# Patient Record
(demographics unavailable — no encounter records)

---

## 2018-01-01 NOTE — RADIOLOGY REPORT (SQ)
EXAM DESCRIPTION:  CT HEAD WITHOUT



COMPLETED DATE/TIME:  1/1/2018 8:37 am



REASON FOR STUDY:  confusion, altered



COMPARISON:  None.



TECHNIQUE:  Axial images acquired through the brain without intravenous contrast.  Images reviewed wi
th bone, brain and subdural windows.  Images stored on PACS.

All CT scanners at this facility use dose modulation, iterative reconstruction, and/or weight based d
osing when appropriate to reduce radiation dose to as low as reasonably achievable (ALARA).

[facility]: Dose Right  [facility]: CareDose    [facility]: Dose Right    [facility]: Teradose 4D    [facility]: Smart Technologies



RADIATION DOSE:  CT Rad equipment meets quality standard of care and radiation dose reduction techniq
ues were employed. CTDIvol: 64.6 mGy. DLP: 1163 mGy-cm. mGy.



LIMITATIONS:  None.



FINDINGS:  VENTRICLES: Normal size and contour.

CEREBRUM: No masses.  No hemorrhage.  No midline shift.  No evidence for acute infarction. Normal gra
y/white matter differentiation. No areas of low density in the white matter.

CEREBELLUM: No masses.  No hemorrhage.  No alteration of density.  No evidence for acute infarction.

EXTRAAXIAL SPACES: No fluid collections.  No masses.

ORBITS AND GLOBE: No intra- or extraconal masses.  Normal contour of globe without masses.

CALVARIUM: No fracture.

PARANASAL SINUSES: No fluid or mucosal thickening.

SOFT TISSUES: No mass or hematoma.

OTHER: No other significant finding.



IMPRESSION:  NO ACUTE INTRACRANIAL IMAGING FINDINGS.

EVIDENCE OF ACUTE STROKE: NO.



COMMENT:  Quality ID # [id number]: Final reports with documentation of one or more dose reduction techniques
 (e.g., Automated exposure control, adjustment of the mA and/or kV according to patient size, use of 
iterative reconstruction technique)



TECHNICAL DOCUMENTATION:  JOB ID:  [id number]

 2011 [employer]- All Rights Reserved

## 2018-01-01 NOTE — RADIOLOGY REPORT (SQ)
EXAM DESCRIPTION:  CHEST SINGLE VIEW



COMPLETED DATE/TIME:  1/1/2018 12:17 pm



REASON FOR STUDY:  confused, fever



COMPARISON:  11/10/2016



EXAM PARAMETERS:  NUMBER OF VIEWS: One view.

TECHNIQUE: Single frontal radiographic view of the chest acquired.

RADIATION DOSE: NA

LIMITATIONS: None.



FINDINGS:  LUNGS AND PLEURA: No opacities, masses or pneumothorax. No pleural effusion.

MEDIASTINUM AND HILAR STRUCTURES: No masses.  Contour normal.

HEART AND VASCULAR STRUCTURES: Heart stable in size.  Normal vasculature.

BONES: No acute findings.

HARDWARE: None in the chest.

OTHER: No other significant finding.



IMPRESSION:  NO ACUTE RADIOGRAPHIC FINDING IN THE CHEST.  NO SIGNIFICANT CHANGE FROM PRIOR STUDY.



TECHNICAL DOCUMENTATION:  JOB ID:  [id number]

 2011 [employer]- All Rights Reserved

## 2018-01-01 NOTE — RADIOLOGY REPORT (SQ)
EXAM DESCRIPTION:  FOOT LEFT COMPLETE



COMPLETED DATE/TIME:  1/1/2018 1:56 pm



REASON FOR STUDY:  cellulitis, left ankle tenderness



COMPARISON:  None.



NUMBER OF VIEWS:  Three views.



TECHNIQUE:  AP, lateral and oblique  radiographic images acquired of the left foot.



LIMITATIONS:  None.



FINDINGS:  MINERALIZATION: Normal.

BONES: No acute fracture or dislocation.  No worrisome bone lesions.

JOINTS: No effusions.

SOFT TISSUES: No soft tissue swelling.  No foreign body.

OTHER: No other significant finding.



IMPRESSION:  NEGATIVE STUDY OF THE LEFT FOOT. NO RADIOGRAPHIC EVIDENCE OF ACUTE INJURY.



TECHNICAL DOCUMENTATION:  JOB ID:  [id number]

 2011 [employer]- All Rights Reserved

## 2018-01-01 NOTE — PDOC H&P
History of Present Illness


Admission Date/PCP: 


  01/01/18 14:28





  NATASHA TAVERAS MD





Patient complains of: Fever


History of Present Illness: 


JOEL RUSSELL is a 59 year old male of Dr Taveras and resident at [facility] who was brought to the ED by EMS with 3-4 days of fever. Facility staff 

reported onset of associated confusion this morning and reported oral 

temperature of 101F. He denied any coughing, nasal or sinus congestion, chest 

pain, dysuria, flank pain, hematuria, nausea, vomiting, or abdominal pain. His 

initial evaluation in the ED was remarkable for left leg cellulitis and 

associated leukocytosis with left shift. he was subsequently advised 

hospitalization for further evaluation and management. His morbidities include 

Hypertesion, Hyperlipidemia, Diabetes Mellitus Type 2, and Depression.





Past Medical History


Cardiac Medical History: Reports: Hyperlipidema, Hypertension


Pulmonary Medical History: Reports: Pneumonia - "Neurocognitive Disorder"


Endocrine Medical History: Reports: Diabetes Mellitus Type 2


Psychiatric Medical History: Reports: Depression





Past Surgical History


Past Surgical History: Reports: Orthopedic Surgery - Left Knee





Social History


Smoking Status: Current Every Day Smoker


Cigarettes Packs Per Day: 1


Frequency of Alcohol Use: None


Hx Recreational Drug Use: No


Drugs: None


Hx Prescription Drug Abuse: No





Family History


Family History: Reviewed & Not Pertinent


Parental Family History Reviewed: Yes


Children Family History Reviewed: Yes


Sibling(s) Family History Reviewed.: Yes





Medication/Allergy


Home Medications: 








Acetaminophen [Tylenol 325 mg Tablet] 650 mg PO Q4HP PRN 01/01/18 


Acetaminophen [Tylenol 325 mg Tablet] 650 mg PO Q8 01/01/18 


Atenolol [Tenormin] 50 mg PO DAILY 01/01/18 


Benztropine Mesylate 1 mg PO BID 01/01/18 


Bismuth Subsalicylate [Bismuth] 15 ml PO Q4HP PRN 01/01/18 


Bupropion HCl [Wellbutrin 75 mg Tablet] 75 mg PO DAILY 01/01/18 


Calcium Carbonate [Tums Chewable 500 mg Tab.chew] 1,000 mg PO Q1HP PRN 01/01/18 


Diazepam [Valium 5 mg Tablet] 5 mg PO Q8HP PRN 01/01/18 


Divalproex Sodium [Depakote] 1,000 mg PO QHS 01/01/18 


Divalproex Sodium [Depakote] 500 mg PO DAILY 01/01/18 


Doxepin HCl [Silenor] 3 mg PO QHS 01/01/18 


Ergocalciferol (Vitamin D2) [Drisdol 50,000 unit (1.25MG) Capsule] 50,000 unit 

PO MO@1000 01/01/18 


Lubiprostone [Amitiza] 24 mcg PO BID 01/01/18 


Mag Hydrox/Al Hydrox/Simeth [Maalox Plus Susp 30 Udcup] 30 ml PO Q4HP PRN 01/01/ 18 


Magnesium Hydroxide [Milk of Magnesia 30 ml Udcup] 30 ml PO BIDP PRN 01/01/18 


Melatonin [Melatin] 3 mg PO QHS 01/01/18 


Metformin HCl [Glucophage] 1,000 mg PO BIDBS 01/01/18 


Methylcellulose [Fiber] 1,000 mg PO BID 01/01/18 


Olanzapine [Olanzapine Odt] 15 mg SL BID 01/01/18 


Propylene Glycol/Peg 400/Pf [Systane 0.3-0.4% Eye Drops] 1 drop OU QID 01/01/18 


Rosuvastatin Calcium [Crestor 20 mg Tablet] 20 mg PO DAILY 01/01/18 


Sennosides [Senna] 8.6 mg PO DAILYP PRN 01/01/18 


Tamsulosin HCl [Flomax 0.4 mg Cap.sr] 0.4 mg PO DAILY 01/01/18 


Zolpidem Tartrate [Ambien] 10 mg PO QHS 01/01/18 








Allergies/Adverse Reactions: 


 





Penicillins Allergy (Verified 08/11/16 11:27)


 











Review of Systems


All systems: reviewed and no additional remarkable complaints except as stated





Physical Exam


Vital Signs: 


 











Temp Pulse Resp BP Pulse Ox


 


 100.4 F   92   24 H  114/48 L  97 


 


 01/01/18 15:01  01/01/18 16:46  01/01/18 16:22  01/01/18 16:22  01/01/18 16:22








 Intake & Output











 12/31/17 01/01/18 01/02/18





 06:59 06:59 06:59


 


Intake Total   400


 


Balance   400











General appearance: PRESENT: no acute distress, well-developed, well-nourished


Head exam: PRESENT: atraumatic, normocephalic


Eye exam: PRESENT: conjunctiva pink, EOMI, PERRLA.  ABSENT: scleral icterus


Mouth exam: PRESENT: moist


Teeth exam: PRESENT: dental caries


Throat exam: ABSENT: post pharyngeal erythema, tonsillar erythema, tonsillar 

exudate, tonsillogmegaly, other


Neck exam: PRESENT: full ROM.  ABSENT: carotid bruit, JVD, lymphadenopathy, 

thyromegaly


Respiratory exam: PRESENT: clear to auscultation lilli.  ABSENT: crackles, 

prolonged expiratory phas, rhonchi, wheezes


Cardiovascular exam: PRESENT: RRR.  ABSENT: diastolic murmur, rubs, systolic 

murmur


Vascular exam: PRESENT: normal capillary refill.  ABSENT: pallor


GI/Abdominal exam: PRESENT: normal bowel sounds, soft.  ABSENT: distended, 

guarding, mass, organolmegaly, rebound, tenderness


Rectal exam: PRESENT: deferred


Extremities exam: PRESENT: pedal edema - mostly involving the left leg


Musculoskeletal exam: PRESENT: tenderness - expressed tenderness to palpation 

of the left leg


Neurological exam: PRESENT: alert, awake, oriented to person, oriented to place

, oriented to time, oriented to situation, CN II-XII grossly intact.  ABSENT: 

motor sensory deficit


Psychiatric exam: PRESENT: appropriate affect, normal mood.  ABSENT: homicidal 

ideation, suicidal ideation


Skin exam: PRESENT: dry, erythema - involving left leg, rash, warm





Results


Laboratory Results: 


I reviewed his lab results on [facility] and form significant portion of my 

medical decision making in this case.


Impressions: 


 





Head CT  01/01/18 07:24


IMPRESSION:  NO ACUTE INTRACRANIAL IMAGING FINDINGS.


EVIDENCE OF ACUTE STROKE: NO.


 








Chest X-Ray  01/01/18 11:11


IMPRESSION:  NO ACUTE RADIOGRAPHIC FINDING IN THE CHEST.  NO SIGNIFICANT CHANGE 

FROM PRIOR STUDY.


 








Tibia/Fibula X-Ray  01/01/18 13:12


IMPRESSION:  NO RADIOGRAPHIC EVIDENCE OF ACUTE INJURY.


 








Foot X-Ray  01/01/18 13:17


IMPRESSION:  NEGATIVE STUDY OF THE LEFT FOOT. NO RADIOGRAPHIC EVIDENCE OF ACUTE 

INJURY.


 














Assessment & Plan





- Diagnosis


(1) Cellulitis of left leg without foot


Is this a current diagnosis for this admission?: Yes   


Plan: 


See covering attending physician orders.








(2) Type 2 diabetes mellitus


Qualifiers: 


   Diabetes mellitus complication status: without complication   Diabetes 

mellitus long term insulin use: without long term use   Qualified Code(s): 

E11.9 - Type 2 diabetes mellitus without complications   


Is this a current diagnosis for this admission?: Yes   


Plan: 


See covering attending physician orders.








(3) HTN (hypertension)


Qualifiers: 


   Hypertension type: essential hypertension   Qualified Code(s): I10 - 

Essential (primary) hypertension   


Is this a current diagnosis for this admission?: Yes   


Plan: 


See covering attending physician orders.








(4) HLD (hyperlipidemia)


Qualifiers: 


   Hyperlipidemia type: pure hypercholesterolemia   Qualified Code(s): E78.00 - 

Pure hypercholesterolemia, unspecified; E78.0 - Pure hypercholesterolemia   


Is this a current diagnosis for this admission?: Yes   


Plan: 


See covering attending physician orders.








(5) Bipolar 1 disorder


Is this a current diagnosis for this admission?: Yes   


Plan: 


See covering attending physician orders.








- Time


Time Spent: 50 to 70 Minutes


Medications reviewed and adjusted accordingly: Yes


Anticipated discharge: Other - [facility]


Within: Other





- Inpatient Certification


Based on my medical assessment, after consideration of the patient's 

comorbidities, presenting symptoms, or acuity I expect that the services needed 

warrant INPATIENT care.: Yes


I certify that my determination is in accordance with my understanding of 

Medicare's requirements for reasonable and necessary INPATIENT services [42 CFR 

412.3e].: Yes


Medical Necessity: Need Close Monitoring Due to Risk of Patient Decompensation, 

Need For IV Fluids, Need For Continuous Telemetry Monitoring, Need for IV 

Antibiotics, Risk of Complication if Not Cared For in Hospital


Post Hospital Care: D/C or Transfer Summary





- Plan Summary


Plan Summary: 


See covering attending physician orders.

## 2018-01-01 NOTE — ER DOCUMENT REPORT
ED Fever





- General


Chief Complaint: Fever


Stated Complaint: FEVER


Time Seen by Provider: 01/01/18 07:32


Mode of Arrival: Ambulatory


Information source: Patient


Notes: 





Patient is a 59-year-old male brought in by EMS from nursing home who presents 

to the ER today for confusion this morning and fever.  Patient states that he 

feels confused.  Fever was as high as 101F at the nursing home, they did give 

him Tylenol on the ambulance.  Patient denies any pain anywhere, cough, runny 

nose, burning with urination or any other symptoms.  He complains of his 

chronic foot pain and back pain.


TRAVEL OUTSIDE OF THE [place] IN LAST 30 DAYS: No





- Related Data


Allergies/Adverse Reactions: 


 





Penicillins Allergy (Verified 08/11/16 11:27)


 








Home Medications: 


 Current Home Medications





Acetaminophen [Tylenol 325 mg Tablet] 650 mg PO Q4HP PRN 01/01/18 [History]


Acetaminophen [Tylenol 325 mg Tablet] 650 mg PO Q8 01/01/18 [History]


Atenolol [Tenormin] 50 mg PO DAILY 01/01/18 [History]


Benztropine Mesylate 1 mg PO BID 01/01/18 [History]


Bismuth Subsalicylate [Bismuth] 15 ml PO Q4HP PRN 01/01/18 [History]


Bupropion HCl [Wellbutrin 75 mg Tablet] 75 mg PO DAILY 01/01/18 [History]


Calcium Carbonate [Tums Chewable 500 mg Tab.chew] 1,000 mg PO Q1HP PRN 01/01/18 

[History]


Diazepam [Valium 5 mg Tablet] 5 mg PO Q8HP PRN 01/01/18 [History]


Divalproex Sodium [Depakote] 1,000 mg PO QHS 01/01/18 [History]


Divalproex Sodium [Depakote] 500 mg PO DAILY 01/01/18 [History]


Doxepin HCl [Silenor] 3 mg PO QHS 01/01/18 [History]


Ergocalciferol (Vitamin D2) [Drisdol 50,000 unit (1.25MG) Capsule] 50,000 unit 

PO MO@1000 01/01/18 [History]


Lubiprostone [Amitiza] 24 mcg PO BID 01/01/18 [History]


Mag Hydrox/Al Hydrox/Simeth [Maalox Plus Susp 30 Udcup] 30 ml PO Q4HP PRN 01/01/ 18 [History]


Magnesium Hydroxide [Milk of Magnesia 30 ml Udcup] 30 ml PO BIDP PRN 01/01/18 [

History]


Melatonin [Melatin] 3 mg PO QHS 01/01/18 [History]


Metformin HCl [Glucophage] 1,000 mg PO BIDBS 01/01/18 [History]


Methylcellulose [Fiber] 1,000 mg PO BID 01/01/18 [History]


Olanzapine [Olanzapine Odt] 15 mg SL BID 01/01/18 [History]


Propylene Glycol/Peg 400/Pf [Systane 0.3-0.4% Eye Drops] 1 drop OU QID 01/01/18 

[History]


Rosuvastatin Calcium [Crestor 20 mg Tablet] 20 mg PO DAILY 01/01/18 [History]


Sennosides [Senna] 8.6 mg PO DAILYP PRN 01/01/18 [History]


Tamsulosin HCl [Flomax 0.4 mg Cap.sr] 0.4 mg PO DAILY 01/01/18 [History]


Zolpidem Tartrate [Ambien] 10 mg PO QHS 01/01/18 [History]











Past Medical History





- General


Information source: Patient, Outside Facility Records





- Social History


Smoking Status: Current Every Day Smoker


Chew tobacco use (# tins/day): No


Frequency of alcohol use: None


Drug Abuse: None


Family History: Reviewed & Not Pertinent


Patient has suicidal ideation: No


Patient has homicidal ideation: No





- Past Medical History


Cardiac Medical History: Reports: Hx Hypercholesterolemia, Hx Hypertension


Pulmonary Medical History: Reports: Hx Pneumonia - "Neurocognitive Disorder"


Endocrine Medical History: Reports: Hx Diabetes Mellitus Type 2


Renal/ Medical History: Denies: Hx Peritoneal Dialysis


Psychiatric Medical History: Reports: Hx Depression


Past Surgical History: Reports: Hx Orthopedic Surgery - Left Knee





- Immunizations


Immunizations up to date: Yes


Hx Diphtheria, Pertussis, Tetanus Vaccination: No





Review of Systems





- Review of Systems


Constitutional: See HPI


EENT: No symptoms reported


Cardiovascular: No symptoms reported


Respiratory: No symptoms reported


Gastrointestinal: No symptoms reported


Genitourinary: No symptoms reported


Male Genitourinary: No symptoms reported


Musculoskeletal: No symptoms reported


Skin: No symptoms reported


Hematologic/Lymphatic: No symptoms reported


Neurological/Psychological: No symptoms reported





Physical Exam





- Vital signs


Vitals: 


 











Temp Pulse Resp BP Pulse Ox


 


 100 F   98   18   118/56 L  95 


 


 01/01/18 07:23  01/01/18 07:23  01/01/18 07:23  01/01/18 07:23  01/01/18 07:23














- Notes


Notes: 





PHYSICAL EXAMINATION: 


GENERAL: Chronically ill-appearing, unkempt, smells of urine, but in no acute 

distress. 


HEAD: Atraumatic, normocephalic. 


EYES: Pupils equal round and reactive to light, extraocular movements intact, 

sclera anicteric, conjunctiva are normal. 


ENT: ear canals without erythema or foreign body, TMs pearly grey with good 

bony landmarks, nares patent, oropharynx clear without exudates. Moist mucous 

membranes. 


NECK: Normal range of motion, supple without lymphadenopathy 


LUNGS: CTAB and equal. No wheezes rales or rhonchi. 


HEART: Regular rate and rhythm without murmurs


ABDOMEN: Soft, mild suprapubic tenderness. No guarding, no rebound 


BACK: no vertebral tenderness, normal ROM


GI/: no CVA tenderness


EXTREMITIES: Normal range of motion, no pitting edema. No cyanosis. 


 NEUROLOGICAL: slow, but Follows commands appropriately, alert and oriented 3, 

cranial nerves grossly intact. Normal sensory/motor exams.  Good and equal 

strength bilaterally, Kernig and Brudzinski's signs negative, Romberg's test 

normal, normal heel to shin testing 


PSYCH: Normal mood, normal affect. 


SKIN: Warm, Dry, normal turgor, erythema noted to the entirety of the left 

lower extremity excluding the foot, tender to palpation, edematous, some 

erythema to the medial left thigh that appears to possibly be streaking from 

the lower extremity erythema, groin with fungal rash with satellite lesions

















Course





- Re-evaluation


Re-evalutation: 





01/01/18 19:03


pt has left lower extremity cellulitis.  Patient started on vancomycin and 

Rocephin, given IV fluids.  Patient has a white count of 17,000 with a left 

shift.  Patient admitted to Dr. Person at this time.  Patient did develop a 

fever while here again and was given Tylenol which resolved the fever.  

Urinalysis without signs of infection. Dr. Martines did evaluate the patient with 

myself. 


01/01/18 19:05








- Vital Signs


Vital signs: 


 











Temp Pulse Resp BP Pulse Ox


 


 100.4 F   92   24 H  114/48 L  97 


 


 01/01/18 15:01  01/01/18 16:46  01/01/18 16:22  01/01/18 16:22  01/01/18 16:22














- Laboratory


Result Diagrams: 


 01/01/18 08:10





 01/01/18 08:10


Laboratory results interpreted by me: 


 











  01/01/18 01/01/18 01/01/18





  08:10 08:10 09:24


 


WBC  17.0 H  


 


RBC  3.80 L  


 


Hgb  11.5 L  


 


Hct  34.5 L  


 


RDW  14.8 H  


 


Seg Neuts % (Manual)  93 H  


 


Lymphocytes % (Manual)  1 L  


 


Abs Neuts (Manual)  16.3 H  


 


Abs Lymphs (Manual)  0.2 L  


 


Sodium   135.6 L 


 


Potassium   3.3 L 


 


Carbon Dioxide   19 L 


 


BUN   31 H 


 


Glucose   127 H 


 


Total Protein   5.8 L 


 


Albumin   3.2 L 


 


Urine Protein    30 H


 


Urine Ketones    20 H


 


Urine Urobilinogen    4.0 H














Discharge





- Discharge


Clinical Impression: 


 Cellulitis of left leg without foot





Condition: Stable


Disposition: ADMITTED AS INPATIENT


Admitting Provider: Naya


Unit Admitted: Telemetry

## 2018-01-02 NOTE — RADIOLOGY REPORT (SQ)
EXAM DESCRIPTION: 



KUB/ABDOMEN (SINGLE VIEW)



CLINICAL HISTORY: 



59 years, Male, Vomiting



COMPARISON:

None.



LIMITATIONS:

None.



FINDINGS:



Moderate to severe nonspecific gaseous gastric distention.

Moderate colonic stool retention.



Partially imaged right femoral hardware.



IMPRESSION:



Nonspecific gastric distention.

 



 2011 [employer]- All Rights Reserved

## 2018-01-02 NOTE — PDOC PROGRESS REPORT
Subjective


Progress Note for:: 01/02/18


Subjective:: 


Patient continue to experience intermittent fever. No bowel movement since last 

evaluation. KUB suggested moderate fecal burden. No chest pain or difficulty 

with breathing. There has been nausea and vomiting but currently under control 

with IV Zofran administration.


Reason For Visit: 


CELLULITIS OF LEFT LOWER EXTREMITY








Physical Exam


Vital Signs: 


 











Temp Pulse Resp BP Pulse Ox


 


 100.3 F   91   16   125/70   95 


 


 01/02/18 04:00  01/02/18 07:00  01/02/18 04:00  01/02/18 04:00  01/02/18 04:00








 Intake & Output











 01/01/18 01/02/18 01/03/18





 06:59 06:59 06:59


 


Intake Total  2080 


 


Output Total  600 


 


Balance  1480 











General appearance: PRESENT: no acute distress, obese


Head exam: PRESENT: atraumatic, normocephalic


Eye exam: PRESENT: conjunctiva pink, EOMI, PERRLA.  ABSENT: scleral icterus


Mouth exam: PRESENT: moist


Respiratory exam: PRESENT: clear to auscultation lilli


Cardiovascular exam: PRESENT: RRR.  ABSENT: diastolic murmur, rubs, systolic 

murmur


Vascular exam: PRESENT: normal capillary refill.  ABSENT: pallor


GI/Abdominal exam: PRESENT: normal bowel sounds, soft.  ABSENT: distended, 

guarding, mass, organolmegaly, rebound, tenderness


Extremities exam: PRESENT: pedal edema - left leg region with cellulitis


Musculoskeletal exam: PRESENT: tenderness - minimally to palpation over left 

leg region with cellulitis


Neurological exam: PRESENT: alert, awake, oriented to person, oriented to place

, oriented to time, oriented to situation, CN II-XII grossly intact.  ABSENT: 

motor sensory deficit


Psychiatric exam: PRESENT: appropriate affect, normal mood.  ABSENT: homicidal 

ideation, suicidal ideation


Skin exam: PRESENT: dry, erythema - left leg region with cellulitis, warm





Results


Laboratory Results: 


 





 01/02/18 05:17 





 01/02/18 05:17 





 











  01/02/18 01/02/18





  05:17 05:17


 


WBC  18.8 H 


 


RBC  3.53 L 


 


Hgb  10.8 L 


 


Hct  31.4 L 


 


MCV  89 


 


MCH  30.5 


 


MCHC  34.2 


 


RDW  14.9 H 


 


Plt Count  239 


 


Seg Neutrophils %  Not Reportable 


 


Lymphocytes %  Not Reportable 


 


Monocytes %  Not Reportable 


 


Eosinophils %  Not Reportable 


 


Basophils %  Not Reportable 


 


Absolute Neutrophils  Not Reportable 


 


Absolute Lymphocytes  Not Reportable 


 


Absolute Monocytes  Not Reportable 


 


Absolute Eosinophils  Not Reportable 


 


Absolute Basophils  Not Reportable 


 


Sodium   138.0


 


Potassium   3.0 L*


 


Chloride   103


 


Carbon Dioxide   25


 


Anion Gap   10


 


BUN   21 H


 


Creatinine   0.62


 


Est GFR ( Amer)   > 60


 


Est GFR (Non-Af Amer)   > 60


 


Glucose   135 H


 


Calcium   8.5


 


Total Bilirubin   0.5


 


AST   43


 


ALT   31


 


Alkaline Phosphatase   57


 


Total Protein   5.2 L


 


Albumin   2.8 L











Impressions: 


 





Head CT  01/01/18 07:24


IMPRESSION:  NO ACUTE INTRACRANIAL IMAGING FINDINGS.


EVIDENCE OF ACUTE STROKE: NO.


 








Chest X-Ray  01/01/18 11:11


IMPRESSION:  NO ACUTE RADIOGRAPHIC FINDING IN THE CHEST.  NO SIGNIFICANT CHANGE 

FROM PRIOR STUDY.


 








Tibia/Fibula X-Ray  01/01/18 13:12


IMPRESSION:  NO RADIOGRAPHIC EVIDENCE OF ACUTE INJURY.


 








Foot X-Ray  01/01/18 13:17


IMPRESSION:  NEGATIVE STUDY OF THE LEFT FOOT. NO RADIOGRAPHIC EVIDENCE OF ACUTE 

INJURY.


 








KUB X-Ray  01/02/18 00:00


IMPRESSION:


 


Nonspecific gastric distention.


 


 


 2011 [employer]- All Rights Reserved


 














Assessment & Plan





- Diagnosis


(1) Cellulitis of left leg without foot


Is this a current diagnosis for this admission?: Yes   





(2) Type 2 diabetes mellitus


Qualifiers: 


   Diabetes mellitus complication status: without complication   Diabetes 

mellitus long term insulin use: without long term use   Qualified Code(s): 

E11.9 - Type 2 diabetes mellitus without complications   


Is this a current diagnosis for this admission?: Yes   





(3) HTN (hypertension)


Qualifiers: 


   Hypertension type: essential hypertension   Qualified Code(s): I10 - 

Essential (primary) hypertension   


Is this a current diagnosis for this admission?: Yes   





(4) HLD (hyperlipidemia)


Qualifiers: 


   Hyperlipidemia type: pure hypercholesterolemia   Qualified Code(s): E78.00 - 

Pure hypercholesterolemia, unspecified; E78.0 - Pure hypercholesterolemia   


Is this a current diagnosis for this admission?: Yes   





(5) Bipolar 1 disorder


Is this a current diagnosis for this admission?: Yes   





- Time


Time Spent with patient: 25-34 minutes


Medications reviewed and adjusted accordingly: Yes


Anticipated discharge: Other - retirement


Within: Other





- Inpatient Certification


Based on my medical assessment, after consideration of the patient's 

comorbidities, presenting symptoms, or acuity I expect that the services needed 

warrant INPATIENT care.: Yes


I certify that my determination is in accordance with my understanding of 

Medicare's requirements for reasonable and necessary INPATIENT services [42 CFR 

412.3e].: Yes


Medical Necessity: Need Close Monitoring Due to Risk of Patient Decompensation, 

Need For IV Fluids, Need For Continuous Telemetry Monitoring, Need for IV 

Antibiotics, Risk of Complication if Not Cared For in Hospital


Post Hospital Care: D/C Planner Documentation





- Plan Summary


Plan Summary: 





See covering attending physician orders.

## 2018-01-03 NOTE — PDOC PROGRESS REPORT
Subjective


Progress Note for:: 01/03/18


Subjective:: 





Patient was admitted for the management of severe cellulitis of the left leg, 

he developed new onset paroxysmal atrial fibrillation last night, he was 

started on Cardizem infusion, presently rate controlled.  The aileen S2 score is 

low, he does not need anticoagulation long-term at the moment, a 2D echo is 

ordered.  There is severe swelling of the left leg a stat venous Doppler of the 

left leg was done it was negative for deep vein thrombosis.


Reason For Visit: 


AFIB AND HEART RATE -155








Physical Exam


Vital Signs: 


 











Temp Pulse Resp BP Pulse Ox


 


 98.6 F   101 H  16   103/54 L  95 


 


 01/03/18 12:03  01/03/18 14:00  01/03/18 12:03  01/03/18 12:03  01/03/18 12:03








 Intake & Output











 01/02/18 01/03/18 01/04/18





 06:59 06:59 06:59


 


Intake Total 2080 4390 


 


Output Total 600 300 


 


Balance 1480 4090 


 


Weight  119.3 kg 











General appearance: PRESENT: no acute distress


Eye exam: PRESENT: PERRLA


Respiratory exam: PRESENT: clear to auscultation lilli


Cardiovascular exam: PRESENT: +S1, +S2


GI/Abdominal exam: PRESENT: soft


Extremities exam: PRESENT: other - swelling ,erythema of the left leg


Neurological exam: PRESENT: alert, CN II-XII grossly intact


Skin exam: PRESENT: erythema





Results


Laboratory Results: 


 





 01/02/18 05:17 





 01/03/18 10:00 





 











  01/03/18 01/03/18





  10:00 10:00


 


Creatinine  0.57 


 


Est GFR ( Amer)  > 60 


 


Est GFR (Non-Af Amer)  > 60 


 


TSH   0.43 L


 


Free T4   1.58











Impressions: 


 





Head CT  01/01/18 07:24


IMPRESSION:  NO ACUTE INTRACRANIAL IMAGING FINDINGS.


EVIDENCE OF ACUTE STROKE: NO.


 








Chest X-Ray  01/01/18 11:11


IMPRESSION:  NO ACUTE RADIOGRAPHIC FINDING IN THE CHEST.  NO SIGNIFICANT CHANGE 

FROM PRIOR STUDY.


 








Tibia/Fibula X-Ray  01/01/18 13:12


IMPRESSION:  NO RADIOGRAPHIC EVIDENCE OF ACUTE INJURY.


 








Foot X-Ray  01/01/18 13:17


IMPRESSION:  NEGATIVE STUDY OF THE LEFT FOOT. NO RADIOGRAPHIC EVIDENCE OF ACUTE 

INJURY.


 








KUB X-Ray  01/02/18 00:00


IMPRESSION:


 


Nonspecific gastric distention.


 


 


 2011 [employer]- All Rights Reserved


 








Venous Doppler Study  01/03/18 00:00


IMPRESSION:  NO EVIDENCE OF DVT OR SVT IN THE LEFT LEG.


 














Assessment & Plan





- Diagnosis


(1) Cellulitis of left leg


Is this a current diagnosis for this admission?: Yes   


Plan: 


Continue IV antibiotic








(2) Paroxysmal atrial fibrillation with rapid ventricular response


Is this a current diagnosis for this admission?: Yes   


Plan: 


Discontinue intravenous Cardizem infusion, start p.o. metoprolol succinate








(3) Morbid obesity


Is this a current diagnosis for this admission?: Yes

## 2018-01-03 NOTE — RADIOLOGY REPORT (SQ)
EXAM DESCRIPTION:  VENOUS UNILATERAL LOWER



COMPLETED DATE/TIME:  1/3/2018 3:17 pm



REASON FOR STUDY:  suspect DVT



COMPARISON:  None.



TECHNIQUE:  Dynamic and static gray scale and color images acquired of the LEFT leg venous system. Se
lected spectral images acquired with additional compression and augmentation maneuvers. The contralat
eral common femoral vein and saphenofemoral junction were also imaged. Images stored on PACS.



LIMITATIONS:  Left peroneal veins not well seen due to large body habitus and leg swelling



FINDINGS:  LEFT

COMMON FEMORAL: Normal phasicity, compression and augmentation. No visualized echogenic material on g
ray scale. No defects on color images.

FEMORAL: Normal compression and augmentation. No visualized echogenic material on gray scale. No defe
cts on color images.

POPLITEAL: Normal compression, augmentation. No visualized echogenic material on gray scale. No defec
ts on color images.

CALF VESSELS: Peroneal veins not well seen.  The anterior tibial and posterior veins demonstrate norm
al compression, augmentation. No visualized echogenic material on gray scale. No defects on color joanne
ges.

GSV and SSV: Normal compression, augmentation. No visualized echogenic material on gray scale. No def
ects on color images.

ANY DEEP VENOUS INSUFFICIENCY: Not evaluated.

ANY EVIDENCE OF POPLITEAL CYST: No.

OTHER: No other significant finding.

RIGHT COMMON FEMORAL VEIN AND SAPHENOFEMORAL JUNCTION:

Normal phasicity, compression and augmentation. No visualized echogenic material on gray scale. No de
fects on color images.



IMPRESSION:  NO EVIDENCE OF DVT OR SVT IN THE LEFT LEG.



TECHNICAL DOCUMENTATION:  JOB ID:  [id number]

 2011 [employer]- All Rights Reserved

## 2018-01-03 NOTE — EKG REPORT
SEVERITY:- ABNORMAL ECG -

ATRIAL FIBRILLATION, V-RATE 

NONSPECIFIC T ABNORMALITIES, LATERAL LEADS

:

Confirmed by: Viola Euceda 03-Jan-2018 10:34:57

## 2018-01-03 NOTE — XCELERA REPORT
[address] [address]

                             Tel: [phone]

                             Fax: [phone]



                    Transthoracic Echocardiogram Report

____________________________________________________________________________



Name: JOEL RUSSELL

MRN: [id number]                Age: 59 yrs

Gender: Male                   : 1958

Patient Status: Inpatient      Patient Location: [address] [place] [address]

Account #: [id number]

Study Date: 2018 02:36 PM

Accession #: [id number]

____________________________________________________________________________



Height: 75 in        Weight: 263 lb        BSA: 2.5 m2



____________________________________________________________________________

Procedure: A complete two-dimensional transthoracic echocardiogram was

performed (2D, M-mode, spectral and color flow Doppler). The study was

technically difficult with many images being suboptimal in quality. The

study was technically limited with all images being suboptimal in quality.

Reason For Study: NEW ONSET A-FIB





Ordering Physician: NATASHA TAVERAS

Performed By: Rhonda Mayfield

____________________________________________________________________________





Interpretation Summary

Due to the poor quality of the echocardiogram, an assessment of left

ventricular ejection fraction cannot be made. Best estimate is LVEF

wellpreserved.

The study was technically difficult with many images being suboptimal in

quality.

The study was technically limited with all images being suboptimal in

quality.

Consider additional methods to assess LVEF such as MUGA scan, CTA heart,

cardiac MRI, SHERWIN, etc. if clinically indicated.

The left ventricle is grossly normal size.

There is no pericardial effusion.



____________________________________________________________________________



MMode/2D Measurements & Calculations

RVDd: 2.2 cm   LVIDd: 5.3 cm FS: 36.2 %             Ao root diam: 3.2 cm

IVSd: 0.82 cm  LVIDs: 3.4 cm EDV(Teich): 137.5 ml

               LVPWd: 1.4 cm ESV(Teich): 47.5 ml    Ao root area: 8.2 cm2

                             EF(Teich): 65.4 %      LA dimension: 4.9 cm



Doppler Measurements & Calculations

MV E max radha:      MV P1/2t max radha:     Ao V2 max:       LV V1 max P.8 cm/sec       143.1 cm/sec          123.3 cm/sec     3.4 mmHg

                   MV P1/2t: 78.4 msec   Ao max PG:       LV V1 max:

                                         6.1 mmHg         92.5 cm/sec

                   MVA(P1/2t): 2.8 cm2

                   MV dec slope:

                   535.0 cm/sec2



____________________________________________________________________________

Left Ventricle

The left ventricle is grossly normal size. Due to the poor quality of the

echocardiogram, an assessment of left ventricular ejection fraction cannot

be made. Best estimate is LVEF wellpreserved. Consider additional methods

to assess LVEF such as MUGA scan, CTA heart, cardiac MRI, SHERWIN, etc. if

clinically indicated.



Right Ventricle

The right ventricle is not well visualized secondary to technical

limitations.



Atria

Right atrium not well visualized secondary to technical limitations. The

left atrium is moderately dilated. Interarterial septum not well visualized

and not well dopplered. Cannot comment on ASD/PFO presence.





Mitral Valve

The mitral valve is not well visualized. There is no mitral valve stenosis.

There is no mitral regurgitation noted.



Aortic Valve

The aortic valve is not well visualized secondary to technical limitations.

There is no aortic valve stenosis. No aortic regurgitation is present.



Tricuspid Valve

The tricuspid valve is not well visualized secondary to technical

limitations. There is no tricuspid stenosis. No tricuspid regurgitation.



Pulmonic Valve

The pulmonic valve is not well visualized.



Great Vessels

The aortic root is not well visualized. The inferior vena cava appeared

normal and decreased < 50% with respiration (RAP 10-15 mmHg).



Effusions

There is no pericardial effusion.





____________________________________________________________________________



Electronically signed by:      Viola Euceda      on 2018 03:28 PM



CC: NATASHA TAVERAS

>

Viola Euceda

## 2018-01-04 NOTE — PDOC PROGRESS REPORT
Subjective


Progress Note for:: 01/04/18


Subjective:: 





Patient is seen by the bedside, still of severe inflammation/infection of the 

left leg


Reason For Visit: 


AFIB AND HEART RATE -155








Physical Exam


Vital Signs: 


 











Temp Pulse Resp BP Pulse Ox


 


 99.0 F   112 H  20   102/55 L  99 


 


 01/04/18 07:32  01/04/18 07:32  01/04/18 07:32  01/04/18 07:32  01/04/18 07:32








 Intake & Output











 01/03/18 01/04/18 01/05/18





 06:59 06:59 06:59


 


Intake Total 4390 2679 


 


Output Total 300 1425 


 


Balance 4090 1254 


 


Weight 119.3 kg 118.3 kg 











General appearance: PRESENT: no acute distress


Eye exam: PRESENT: PERRLA


Respiratory exam: PRESENT: clear to auscultation lilli


Cardiovascular exam: PRESENT: +S1, +S2


GI/Abdominal exam: PRESENT: soft


Neurological exam: PRESENT: alert





Results


Laboratory Results: 


 





 01/04/18 09:45 





 01/04/18 09:45 





 











  01/03/18 01/04/18 01/04/18





  10:00 09:45 09:45


 


WBC   18.5 H 


 


RBC   3.54 L 


 


Hgb   10.7 L 


 


Hct   31.8 L 


 


MCV   90 


 


MCH   30.3 


 


MCHC   33.8 


 


RDW   15.6 H 


 


Plt Count   233 


 


Seg Neutrophils %   Not Reportable 


 


Lymphocytes %   Not Reportable 


 


Monocytes %   Not Reportable 


 


Eosinophils %   Not Reportable 


 


Basophils %   Not Reportable 


 


Absolute Neutrophils   Not Reportable 


 


Absolute Lymphocytes   Not Reportable 


 


Absolute Monocytes   Not Reportable 


 


Absolute Eosinophils   Not Reportable 


 


Absolute Basophils   Not Reportable 


 


Sodium    131.7 L


 


Potassium    3.3 L


 


Chloride    99


 


Carbon Dioxide    24


 


Anion Gap    9


 


BUN    10


 


Creatinine    0.58


 


Est GFR ( Amer)    > 60


 


Est GFR (Non-Af Amer)    > 60


 


Glucose    76


 


Calcium    8.3 L


 


Total Bilirubin    0.4


 


AST    76 H


 


ALT    44


 


Alkaline Phosphatase    66


 


Total Protein    4.7 L


 


Albumin    2.3 L


 


TSH  0.43 L  


 


Free T4  1.58  











Impressions: 


 





Head CT  01/01/18 07:24


IMPRESSION:  NO ACUTE INTRACRANIAL IMAGING FINDINGS.


EVIDENCE OF ACUTE STROKE: NO.


 








Chest X-Ray  01/01/18 11:11


IMPRESSION:  NO ACUTE RADIOGRAPHIC FINDING IN THE CHEST.  NO SIGNIFICANT CHANGE 

FROM PRIOR STUDY.


 








Tibia/Fibula X-Ray  01/01/18 13:12


IMPRESSION:  NO RADIOGRAPHIC EVIDENCE OF ACUTE INJURY.


 








Foot X-Ray  01/01/18 13:17


IMPRESSION:  NEGATIVE STUDY OF THE LEFT FOOT. NO RADIOGRAPHIC EVIDENCE OF ACUTE 

INJURY.


 








KUB X-Ray  01/02/18 00:00


IMPRESSION:


 


Nonspecific gastric distention.


 


 


 [facility] [employer]- All Rights Reserved


 








Venous Doppler Study  01/03/18 00:00


IMPRESSION:  NO EVIDENCE OF DVT OR SVT IN THE LEFT LEG.


 














Assessment & Plan





- Diagnosis


(1) Cellulitis of left leg


Is this a current diagnosis for this admission?: Yes   





(2) Paroxysmal atrial fibrillation with rapid ventricular response


Is this a current diagnosis for this admission?: Yes   


Plan: 


The heart rate still over 100, increase dose of metoprolol








(3) Morbid obesity


Is this a current diagnosis for this admission?: Yes

## 2018-01-05 NOTE — PDOC PROGRESS REPORT
Subjective


Progress Note for:: 01/05/18


Subjective:: 





Patient was admitted for the management of severe cellulitis of the left leg, 

on inspection of the leg today, especially at the left ankle area there is area 

of necrosis of the skin, lots of exudative secretion, patient may need 

debridement, consultation will be requested from surgery, I do not think this 

is compartment syndrome.


Reason For Visit: 


AFIB AND HEART RATE -155








Physical Exam


Vital Signs: 


 











Temp Pulse Resp BP Pulse Ox


 


 97.9 F   119 H  20   107/45 L  96 


 


 01/05/18 07:51  01/05/18 07:51  01/05/18 07:51  01/05/18 07:51  01/05/18 07:51








 Intake & Output











 01/04/18 01/05/18 01/06/18





 06:59 06:59 06:59


 


Intake Total 2679 2050 


 


Output Total 1425 3300 


 


Balance 1254 -1250 


 


Weight 118.3 kg  











General appearance: PRESENT: no acute distress, well-developed, well-nourished


Head exam: PRESENT: atraumatic, normocephalic


Eye exam: PRESENT: PERRLA


Neck exam: PRESENT: full ROM


Respiratory exam: PRESENT: clear to auscultation lilli


Cardiovascular exam: PRESENT: RRR, +S1, +S2


GI/Abdominal exam: PRESENT: normal bowel sounds, soft


Rectal exam: PRESENT: deferred


Extremities exam: PRESENT: pedal edema, other - There is necrosis, slough, of 

the skin of the left ankle


Neurological exam: PRESENT: alert


Skin exam: PRESENT: dry, intact, warm





Results


Laboratory Results: 


 





 01/05/18 10:03 





 01/05/18 10:03 





 











  01/04/18 01/05/18 01/05/18





  17:45 10:03 10:03


 


WBC   19.7 H 


 


RBC   3.31 L 


 


Hgb   10.0 L 


 


Hct   29.8 L 


 


MCV   90 


 


MCH   30.3 


 


MCHC   33.6 


 


RDW   15.9 H 


 


Plt Count   289 


 


Seg Neutrophils %   Not Reportable 


 


Lymphocytes %   Not Reportable 


 


Monocytes %   Not Reportable 


 


Eosinophils %   Not Reportable 


 


Basophils %   Not Reportable 


 


Absolute Neutrophils   Not Reportable 


 


Absolute Lymphocytes   Not Reportable 


 


Absolute Monocytes   Not Reportable 


 


Absolute Eosinophils   Not Reportable 


 


Absolute Basophils   Not Reportable 


 


Sodium    132.4 L


 


Potassium    3.4 L


 


Chloride    99


 


Carbon Dioxide    25


 


Anion Gap    8


 


BUN    12


 


Creatinine  0.58   0.80


 


Est GFR ( Amer)  > 60   > 60


 


Est GFR (Non-Af Amer)  > 60   > 60


 


Glucose    81


 


Calcium    8.2 L


 


Total Bilirubin    0.3


 


AST    72 H


 


ALT    46


 


Alkaline Phosphatase    81


 


Total Protein    4.7 L


 


Albumin    2.3 L











Impressions: 


 





Head CT  01/01/18 07:24


IMPRESSION:  NO ACUTE INTRACRANIAL IMAGING FINDINGS.


EVIDENCE OF ACUTE STROKE: NO.


 








Chest X-Ray  01/01/18 11:11


IMPRESSION:  NO ACUTE RADIOGRAPHIC FINDING IN THE CHEST.  NO SIGNIFICANT CHANGE 

FROM PRIOR STUDY.


 








Tibia/Fibula X-Ray  01/01/18 13:12


IMPRESSION:  NO RADIOGRAPHIC EVIDENCE OF ACUTE INJURY.


 








Foot X-Ray  01/01/18 13:17


IMPRESSION:  NEGATIVE STUDY OF THE LEFT FOOT. NO RADIOGRAPHIC EVIDENCE OF ACUTE 

INJURY.


 








KUB X-Ray  01/02/18 00:00


IMPRESSION:


 


Nonspecific gastric distention.


 


 


 [facility] [employer]- All Rights Reserved


 








Venous Doppler Study  01/03/18 00:00


IMPRESSION:  NO EVIDENCE OF DVT OR SVT IN THE LEFT LEG.


 














Assessment & Plan





- Diagnosis


(1) Cellulitis of left leg


Is this a current diagnosis for this admission?: Yes   





(2) Paroxysmal atrial fibrillation with rapid ventricular response


Is this a current diagnosis for this admission?: Yes   





(3) Morbid obesity


Is this a current diagnosis for this admission?: Yes   





- Plan Summary


Plan Summary: 





Surgical consultation is obtained for evaluation of the left ankle for possible 

debridement of areas of devitalization of the skin

## 2018-01-05 NOTE — PDOC CONSULTATION
History of Present Illness


Admission Date/PCP: 


  01/01/18 14:28





  NATASHA TAVERAS MD





Patient complains of: left lowr leg tenderness


History of Present Illness: 





Patient claims his left lower leg has been swollen for the past 6 months. Nurse 

claims dark discoloration left lateral foot/ankle just noted this am. Patient 

denies any trauma and no pains unless left lower leg is touched.





Past Medical History


Cardiac Medical History: Reports: Hyperlipidema, Hypertension


Pulmonary Medical History: Reports: Pneumonia - "Neurocognitive Disorder"


Endocrine Medical History: Reports: Diabetes Mellitus Type 2


Psychiatric Medical History: Reports: Depression





Past Surgical History


Past Surgical History: Reports: Orthopedic Surgery - Left Knee





Social History


Smoking Status: Current Every Day Smoker


Cigarettes Packs Per Day: 1


Frequency of Alcohol Use: None


Hx Recreational Drug Use: No


Drugs: None


Hx Prescription Drug Abuse: No





- Advance Directive


Resuscitation Status: Full Code





Family History


Family History: Reviewed & Not Pertinent


Parental Family History Reviewed: Yes


Children Family History Reviewed: Yes


Sibling(s) Family History Reviewed.: Yes





Medication/Allergy


Home Medications: 








Acetaminophen [Tylenol 325 mg Tablet] 650 mg PO Q4HP PRN 01/01/18 


Acetaminophen [Tylenol 325 mg Tablet] 650 mg PO Q8 01/01/18 


Atenolol [Tenormin] 50 mg PO DAILY 01/01/18 


Benztropine Mesylate 1 mg PO BID 01/01/18 


Bismuth Subsalicylate [Bismuth] 15 ml PO Q4HP PRN 01/01/18 


Bupropion HCl [Wellbutrin 75 mg Tablet] 75 mg PO DAILY 01/01/18 


Calcium Carbonate [Tums Chewable 500 mg Tab.chew] 1,000 mg PO Q1HP PRN 01/01/18 


Diazepam [Valium 5 mg Tablet] 5 mg PO Q8HP PRN 01/01/18 


Divalproex Sodium [Depakote] 1,000 mg PO QHS 01/01/18 


Divalproex Sodium [Depakote] 500 mg PO DAILY 01/01/18 


Doxepin HCl [Silenor] 3 mg PO QHS 01/01/18 


Ergocalciferol (Vitamin D2) [Drisdol 50,000 unit (1.25MG) Capsule] 50,000 unit 

PO MO@1000 01/01/18 


Lubiprostone [Amitiza] 24 mcg PO BID 01/01/18 


Mag Hydrox/Al Hydrox/Simeth [Maalox Plus Susp 30 Udcup] 30 ml PO Q4HP PRN 01/01/ 18 


Magnesium Hydroxide [Milk of Magnesia 30 ml Udcup] 30 ml PO BIDP PRN 01/01/18 


Melatonin [Melatin] 3 mg PO QHS 01/01/18 


Metformin HCl [Glucophage] 1,000 mg PO BIDBS 01/01/18 


Methylcellulose [Fiber] 1,000 mg PO BID 01/01/18 


Olanzapine [Olanzapine Odt] 15 mg SL BID 01/01/18 


Propylene Glycol/Peg 400/Pf [Systane 0.3-0.4% Eye Drops] 1 drop OU QID 01/01/18 


Rosuvastatin Calcium [Crestor 20 mg Tablet] 20 mg PO DAILY 01/01/18 


Sennosides [Senna] 8.6 mg PO DAILYP PRN 01/01/18 


Tamsulosin HCl [Flomax 0.4 mg Cap.sr] 0.4 mg PO DAILY 01/01/18 


Zolpidem Tartrate [Ambien] 10 mg PO QHS 01/01/18 








Allergies/Adverse Reactions: 


 





Penicillins Allergy (Verified 08/11/16 11:27)


 











Review of Systems


Constitutional: PRESENT: weakness, other


Eyes: PRESENT: other - no visual/hearing problems


Nose, Mouth, and Throat: PRESENT: other - no sore throat


Cardiovascular: PRESENT: other - no chest pains


Respiratory: PRESENT: other - no cough


Gastrointestinal: PRESENT: other - Claims his GI doctor told him he has "lazy 

Bowel"


Musculoskeletal: PRESENT: muscle weakness


Integumentary: PRESENT: erythema - left lower leg with swelling


Neurological: PRESENT: weakness


Psychiatric: PRESENT: depression


Endocrine: PRESENT: other - no polyuria


Hematologic/Lymphatic: PRESENT: easy bruising - left lower leg





Physical Exam


Vital Signs: 


 











Temp Pulse Resp BP Pulse Ox


 


 97.9 F   119 H  20   107/45 L  96 


 


 01/05/18 07:51  01/05/18 07:51  01/05/18 07:51  01/05/18 07:51  01/05/18 07:51








 Intake & Output











 01/04/18 01/05/18 01/06/18





 06:59 06:59 06:59


 


Intake Total 2679 2050 0


 


Output Total 1425 3300 400


 


Balance 1254 -1250 -400


 


Weight 118.3 kg  











General appearance: PRESENT: no acute distress


Head exam: PRESENT: atraumatic


Eye exam: PRESENT: conjunctiva pink


Mouth exam: PRESENT: moist, tongue midline


Neck exam: PRESENT: other - trachea midline


Respiratory exam: PRESENT: clear to auscultation lilli


Cardiovascular exam: PRESENT: RRR


Pulses: PRESENT: normal dorsalis pedis pul - left ankle with normal Posterior 

tibial artery pulse


GI/Abdominal exam: PRESENT: soft - Has a small umbilical hernia,reducible 

nontender


Rectal exam: PRESENT: deferred


Extremities exam: PRESENT: pedal edema, +2 edema - with erythema and blisters 

left lower leg below knee. Small raw area on midanterolateral calf about 

[id number]. Has diffuse erythema with onion skin whole left below knee 

anterolateral area. Left lateral foot/ankle with large Unopened slightly dark 

discolored blister. No evidenced of collection underneath skin at this time 

which was noticed by his nurse this am prompting the consult.


Neurological exam: PRESENT: alert, oriented to person, oriented to place, 

oriented to time, oriented to situation


Psychiatric exam: PRESENT: appropriate affect


Skin exam: PRESENT: erythema - with thin skin blisters with patchy dark 

discoloration.





Results


Laboratory Results: 


 





 01/05/18 10:03 





 01/05/18 10:03 





 











  01/04/18 01/05/18 01/05/18





  17:45 10:03 10:03


 


WBC   19.7 H 


 


RBC   3.31 L 


 


Hgb   10.0 L 


 


Hct   29.8 L 


 


MCV   90 


 


MCH   30.3 


 


MCHC   33.6 


 


RDW   15.9 H 


 


Plt Count   289 


 


Seg Neutrophils %   Not Reportable 


 


Lymphocytes %   Not Reportable 


 


Monocytes %   Not Reportable 


 


Eosinophils %   Not Reportable 


 


Basophils %   Not Reportable 


 


Absolute Neutrophils   Not Reportable 


 


Absolute Lymphocytes   Not Reportable 


 


Absolute Monocytes   Not Reportable 


 


Absolute Eosinophils   Not Reportable 


 


Absolute Basophils   Not Reportable 


 


Sodium    132.4 L


 


Potassium    3.4 L


 


Chloride    99


 


Carbon Dioxide    25


 


Anion Gap    8


 


BUN    12


 


Creatinine  0.58   0.80


 


Est GFR ( Amer)  > 60   > 60


 


Est GFR (Non-Af Amer)  > 60   > 60


 


Glucose    81


 


Calcium    8.2 L


 


Total Bilirubin    0.3


 


AST    72 H


 


ALT    46


 


Alkaline Phosphatase    81


 


Total Protein    4.7 L


 


Albumin    2.3 L











Impressions: 


 





Head CT  01/01/18 07:24


IMPRESSION:  NO ACUTE INTRACRANIAL IMAGING FINDINGS.


EVIDENCE OF ACUTE STROKE: NO.


 








Chest X-Ray  01/01/18 11:11


IMPRESSION:  NO ACUTE RADIOGRAPHIC FINDING IN THE CHEST.  NO SIGNIFICANT CHANGE 

FROM PRIOR STUDY.


 








Tibia/Fibula X-Ray  01/01/18 13:12


IMPRESSION:  NO RADIOGRAPHIC EVIDENCE OF ACUTE INJURY.


 








Foot X-Ray  01/01/18 13:17


IMPRESSION:  NEGATIVE STUDY OF THE LEFT FOOT. NO RADIOGRAPHIC EVIDENCE OF ACUTE 

INJURY.


 








KUB X-Ray  01/02/18 00:00


IMPRESSION:


 


Nonspecific gastric distention.


 


 


 Formerly named [facility] [email or web address]- All Rights Reserved


 








Venous Doppler Study  01/03/18 00:00


IMPRESSION:  NO EVIDENCE OF DVT OR SVT IN THE LEFT LEG.


 














Assessment & Plan





- Time


Time Spent: 30 to 50 Minutes





- Inpatient Certification


Medical Necessity: Significant Comorbidiites Make Outpatient Treatment Too Risky

, Need for IV Antibiotics, Risk of Complication if Not Cared For in Hospital





- Plan Summary


Plan Summary: 





Start silvadene cream over denuded skin area left lower leg BID


Continue IV antibiotics.


Venous ultrasound both legs if not done yet


Left leg elevation to hopefully decreased the edema


Will follow

## 2018-01-06 NOTE — PDOC PROGRESS REPORT
Subjective


Progress Note for:: 01/06/18


Subjective:: 





less pains left lower leg


Reason For Visit: 


AFIB AND HEART RATE -155








Physical Exam


Vital Signs: 


 











Temp Pulse Resp BP Pulse Ox


 


 98.4 F   116 H  20   103/63   97 


 


 01/06/18 11:17  01/06/18 14:00  01/06/18 11:17  01/06/18 11:17  01/06/18 11:17








 Intake & Output











 01/05/18 01/06/18 01/07/18





 06:59 06:59 06:59


 


Intake Total 2050 1987 118


 


Output Total 3300 1450 975


 


Balance -1250 537 -857


 


Weight  114.1 kg 











Exam: 





left lower leg cellulitis and edema improving.


Opened blister on left lateral foot with no purulent discharge








Results


Laboratory Results: 


 





 01/05/18 10:03 





 01/05/18 10:03 








Impressions: 


 





Head CT  01/01/18 07:24


IMPRESSION:  NO ACUTE INTRACRANIAL IMAGING FINDINGS.


EVIDENCE OF ACUTE STROKE: NO.


 








Chest X-Ray  01/01/18 11:11


IMPRESSION:  NO ACUTE RADIOGRAPHIC FINDING IN THE CHEST.  NO SIGNIFICANT CHANGE 

FROM PRIOR STUDY.


 








Tibia/Fibula X-Ray  01/01/18 13:12


IMPRESSION:  NO RADIOGRAPHIC EVIDENCE OF ACUTE INJURY.


 








Foot X-Ray  01/01/18 13:17


IMPRESSION:  NEGATIVE STUDY OF THE LEFT FOOT. NO RADIOGRAPHIC EVIDENCE OF ACUTE 

INJURY.


 








KUB X-Ray  01/02/18 00:00


IMPRESSION:


 


Nonspecific gastric distention.


 


 


 [facility] [employer]- All Rights Reserved


 








Venous Doppler Study  01/03/18 00:00


IMPRESSION:  NO EVIDENCE OF DVT OR SVT IN THE LEFT LEG.


 














Assessment & Plan





- Time


Time Spent with patient: 15-24 minutes





- Inpatient Certification


Medical Necessity: Need Close Monitoring Due to Risk of Patient Decompensation, 

Need for Pain Control, Need for IV Antibiotics, Risk of Complication if Not 

Cared For in Hospital





- Plan Summary


Plan Summary: 





Continue IV antibiotics,silvadene topical BID and leg elevation.


Pt had doppler done 1/3/18 with no DVT left leg. No need to reorder.

## 2018-01-06 NOTE — PDOC PROGRESS REPORT
Subjective


Progress Note for:: 01/06/18


Subjective:: 





Patient is currently doing fair seen by the general surgery for the left lower 

extremity cellulitis and suggest to elevate the foot and dressing change and 

continues to IV antibiotic and suggest oral ultrasound to rule out the DVT


Reason For Visit: 


AFIB AND HEART RATE -155








Physical Exam


Vital Signs: 


 











Temp Pulse Resp BP Pulse Ox


 


 98.1 F   100   28 H  101/69   96 


 


 01/06/18 07:17  01/06/18 07:17  01/06/18 07:17  01/06/18 07:17  01/06/18 07:17








 Intake & Output











 01/05/18 01/06/18 01/07/18





 06:59 06:59 06:59


 


Intake Total 2050 1987 


 


Output Total 3300 1450 


 


Balance -1250 537 


 


Weight  114.1 kg 











General appearance: PRESENT: no acute distress, well-developed, well-nourished


Head exam: PRESENT: atraumatic, normocephalic


Eye exam: PRESENT: conjunctiva pink, EOMI, PERRLA.  ABSENT: scleral icterus


Ear exam: PRESENT: normal external ear exam


Mouth exam: PRESENT: moist, tongue midline


Neck exam: PRESENT: full ROM.  ABSENT: carotid bruit, JVD, lymphadenopathy, 

thyromegaly


Respiratory exam: PRESENT: clear to auscultation lilli


Cardiovascular exam: PRESENT: RRR.  ABSENT: diastolic murmur, rubs, systolic 

murmur


Pulses: PRESENT: normal dorsalis pedis pul, +2 pedal pulses bilateral


Vascular exam: PRESENT: normal capillary refill


GI/Abdominal exam: PRESENT: normal bowel sounds, soft.  ABSENT: distended, 

guarding, mass, organolmegaly, rebound, tenderness


Rectal exam: PRESENT: deferred


Extremities exam: PRESENT: pedal edema


Additional comments: 





Lower extremities the swelling and the redness is present


Neurological exam: PRESENT: alert, awake, oriented to person, oriented to 

place.  ABSENT: motor sensory deficit


Psychiatric exam: PRESENT: appropriate affect, normal mood.  ABSENT: homicidal 

ideation, suicidal ideation


Skin exam: PRESENT: dry, intact, warm.  ABSENT: cyanosis, rash





Results


Laboratory Results: 


 





 01/05/18 10:03 





 01/05/18 10:03 








Impressions: 


 





Head CT  01/01/18 07:24


IMPRESSION:  NO ACUTE INTRACRANIAL IMAGING FINDINGS.


EVIDENCE OF ACUTE STROKE: NO.


 








Chest X-Ray  01/01/18 11:11


IMPRESSION:  NO ACUTE RADIOGRAPHIC FINDING IN THE CHEST.  NO SIGNIFICANT CHANGE 

FROM PRIOR STUDY.


 








Tibia/Fibula X-Ray  01/01/18 13:12


IMPRESSION:  NO RADIOGRAPHIC EVIDENCE OF ACUTE INJURY.


 








Foot X-Ray  01/01/18 13:17


IMPRESSION:  NEGATIVE STUDY OF THE LEFT FOOT. NO RADIOGRAPHIC EVIDENCE OF ACUTE 

INJURY.


 








KUB X-Ray  01/02/18 00:00


IMPRESSION:


 


Nonspecific gastric distention.


 


 


 2011 [employer]- All Rights Reserved


 








Venous Doppler Study  01/03/18 00:00


IMPRESSION:  NO EVIDENCE OF DVT OR SVT IN THE LEFT LEG.


 














Assessment & Plan





- Diagnosis


(1) Cellulitis of left leg


Is this a current diagnosis for this admission?: Yes   


Plan: 


Continue IV antibiotic








(2) HLD (hyperlipidemia)


Qualifiers: 


   Hyperlipidemia type: pure hypercholesterolemia   Qualified Code(s): E78.00 - 

Pure hypercholesterolemia, unspecified; E78.0 - Pure hypercholesterolemia   


Is this a current diagnosis for this admission?: Yes   





(3) HTN (hypertension)


Qualifiers: 


   Hypertension type: essential hypertension   Qualified Code(s): I10 - 

Essential (primary) hypertension   


Is this a current diagnosis for this admission?: Yes   





(4) Bipolar 1 disorder


Is this a current diagnosis for this admission?: Yes   





(5) Type 2 diabetes mellitus


Qualifiers: 


   Diabetes mellitus complication status: without complication   Diabetes 

mellitus long term insulin use: without long term use   Qualified Code(s): 

E11.9 - Type 2 diabetes mellitus without complications   


Is this a current diagnosis for this admission?: Yes   





- Time


Time Spent with patient: 15-24 minutes


Medications reviewed and adjusted accordingly: Yes


Within: Other





- Inpatient Certification


Medical Necessity: Need Close Monitoring Due to Risk of Patient Decompensation


Post Hospital Care: D/C Planner Documentation





- Plan Summary


Plan Summary: 





Continues IV antibiotic will order the ultrasound for the bilateral lower 

extremity

## 2018-01-07 NOTE — PDOC PROGRESS REPORT
Subjective


Progress Note for:: 01/07/18


Subjective:: 





Less pains left lower leg lesion. Able to move left toes better


Reason For Visit: 


AFIB AND HEART RATE -155








Physical Exam


Vital Signs: 


 











Temp Pulse Resp BP Pulse Ox


 


 98.7 F   116 H  28 H  94/64 L  99 


 


 01/07/18 07:21  01/07/18 07:21  01/07/18 07:21  01/07/18 07:21  01/07/18 07:21








 Intake & Output











 01/06/18 01/07/18 01/08/18





 06:59 06:59 06:59


 


Intake Total 1987 1828 


 


Output Total 1450 2076 


 


Balance 537 -247 


 


Weight 114.1 kg 119.7 kg 











Exam: 





Swelling and erythema improving with silvadene dressing and IV antibiotics





Results


Laboratory Results: 


 





 01/05/18 10:03 





 01/06/18 17:40 





 











  01/06/18





  17:40


 


Creatinine  0.87


 


Est GFR ( Amer)  > 60


 


Est GFR (Non-Af Amer)  > 60











Impressions: 


 





Head CT  01/01/18 07:24


IMPRESSION:  NO ACUTE INTRACRANIAL IMAGING FINDINGS.


EVIDENCE OF ACUTE STROKE: NO.


 








Chest X-Ray  01/01/18 11:11


IMPRESSION:  NO ACUTE RADIOGRAPHIC FINDING IN THE CHEST.  NO SIGNIFICANT CHANGE 

FROM PRIOR STUDY.


 








Tibia/Fibula X-Ray  01/01/18 13:12


IMPRESSION:  NO RADIOGRAPHIC EVIDENCE OF ACUTE INJURY.


 








Foot X-Ray  01/01/18 13:17


IMPRESSION:  NEGATIVE STUDY OF THE LEFT FOOT. NO RADIOGRAPHIC EVIDENCE OF ACUTE 

INJURY.


 








KUB X-Ray  01/02/18 00:00


IMPRESSION:


 


Nonspecific gastric distention.


 


 


 2011 [employer]- All Rights Reserved


 








Venous Doppler Study  01/03/18 00:00


IMPRESSION:  NO EVIDENCE OF DVT OR SVT IN THE LEFT LEG.


 














Assessment & Plan





- Time


Time Spent with patient: 15-24 minutes





- Plan Summary


Plan Summary: 





Continue IV antibiotics, silvadene topical placement and leg elevation

## 2018-01-07 NOTE — PROGRESS NOTE E
Progress Note



NAME: JOEL RUSSELL

MRN: [id number]

: 1958             AGE: 59Y

DATE:  2018           ROOM: 329



SUBJECTIVE:

The patient apparently doing fair.  No chest pain.  No shortness of

breath.  The patient seen by general surgery about the left leg and

currently all stable.  No overnight events happened.



OBJECTIVE:

VITAL SIGNS:  Currently stable.



GENERAL:  The patient is alert, awake, oriented, in no acute distress.



HEAD AND NECK:  Normocephalic.  PERRLA.



LUNGS:  No wheezing, no rales, no rhonchi.



HEART:  S1, S2 is present.



ABDOMEN:  Soft.  Bowel sounds present.



EXTREMITIES:  On the left lower extremity the redness is still present

with some mild swelling.





ASSESSMENT:

1.  LEFT LOWER EXTREMITY CELLULITIS.  Currently all stable, continue the

current medications. Follow with general surgery.

2.  OTHER MULTIPLE COMORBIDITIES CURRENTLY ALL STABLE.



PLAN:

Continue the current medications.  See other MD orders.





DICTATING PHYSICIAN:  BUSTER BREWSTER M.D.





[id number]                  DT: 2018

[facility]#: [id number]            DD: 2018

ID:   [id number]           JOB#: [id number]       ACCT: [id number]



cc:

>

## 2018-01-08 NOTE — PDOC PROGRESS REPORT
Subjective


Progress Note for:: 01/08/18


Reason For Visit: 


AFIB AND HEART RATE -155


No complaints





Physical Exam


Vital Signs: 


 











Temp Pulse Resp BP Pulse Ox


 


 98.4 F   113 H  18   148/97 H  95 


 


 01/08/18 11:18  01/08/18 14:00  01/08/18 11:18  01/08/18 11:18  01/08/18 11:18








 Intake & Output











 01/07/18 01/08/18 01/09/18





 06:59 06:59 06:59


 


Intake Total 1828 895 


 


Output Total 2075 1500 


 


Balance -247 -605 


 


Weight 119.7 kg  











General appearance: PRESENT: no acute distress


Musculoskeletal exam: PRESENT: other - Left lower extremity examined.  Patient'

s toenails are very long; patient has marked amount of exfoliating bulla 

mechanically debrided at bedside with 4 x 4's.  There is an insignificant 

amount of necrotic tissue.  There is marked edema.





Results


Laboratory Results: 


 





 01/05/18 10:03 





 01/06/18 17:40 








Impressions: 


 





Head CT  01/01/18 07:24


IMPRESSION:  NO ACUTE INTRACRANIAL IMAGING FINDINGS.


EVIDENCE OF ACUTE STROKE: NO.


 








Chest X-Ray  01/01/18 11:11


IMPRESSION:  NO ACUTE RADIOGRAPHIC FINDING IN THE CHEST.  NO SIGNIFICANT CHANGE 

FROM PRIOR STUDY.


 








Tibia/Fibula X-Ray  01/01/18 13:12


IMPRESSION:  NO RADIOGRAPHIC EVIDENCE OF ACUTE INJURY.


 








Foot X-Ray  01/01/18 13:17


IMPRESSION:  NEGATIVE STUDY OF THE LEFT FOOT. NO RADIOGRAPHIC EVIDENCE OF ACUTE 

INJURY.


 








KUB X-Ray  01/02/18 00:00


IMPRESSION:


 


Nonspecific gastric distention.


 


 


 [facility] [employer]- All Rights Reserved


 








Venous Doppler Study  01/03/18 00:00


IMPRESSION:  NO EVIDENCE OF DVT OR SVT IN THE LEFT LEG.


 














Assessment & Plan





- Diagnosis


(1) Cellulitis of left leg without foot


Is this a current diagnosis for this admission?: Yes   


Plan: 


Clinically improved; status post bedside debridement, well tolerated.





Plan:





1.  Recommended soapy water washes with scrub brush, rinse with saline, reapply 

to the, and Kerlix.








2.  Leg elevation  





3.  Surgery will sign off; reconsult if needed

## 2018-01-08 NOTE — PDOC PROGRESS REPORT
Subjective


Progress Note for:: 01/08/18


Subjective:: 





Patient was seen by the bedside, he had excisional debridement of the left leg 

today by the surgeon.


Reason For Visit: 


AFIB AND HEART RATE -155








Physical Exam


Vital Signs: 


 











Temp Pulse Resp BP Pulse Ox


 


 98.5 F   87   18   93/53 L  97 


 


 01/08/18 19:22  01/08/18 19:22  01/08/18 19:22  01/08/18 19:22  01/08/18 19:22








 Intake & Output











 01/07/18 01/08/18 01/09/18





 06:59 06:59 06:59


 


Intake Total 8675 459 0393


 


Output Total 2075 1500 1100


 


Balance -247 -605 6


 


Weight 119.7 kg  











General appearance: PRESENT: no acute distress


Head exam: PRESENT: atraumatic, normocephalic


Eye exam: PRESENT: conjunctiva pink, EOMI, PERRLA


Ear exam: PRESENT: normal external ear exam


Mouth exam: PRESENT: moist, tongue midline


Neck exam: PRESENT: full ROM


Respiratory exam: PRESENT: clear to auscultation lilli


Cardiovascular exam: PRESENT: RRR, +S1, +S2


Pulses: PRESENT: normal dorsalis pedis pul, +2 pedal pulses bilateral


Vascular exam: PRESENT: normal capillary refill


GI/Abdominal exam: PRESENT: normal bowel sounds, soft


Rectal exam: PRESENT: deferred


Neurological exam: PRESENT: alert, CN II-XII grossly intact.  ABSENT: motor 

sensory deficit


Psychiatric exam: PRESENT: appropriate affect, normal mood


Skin exam: PRESENT: dry, intact, warm.  ABSENT: cyanosis, rash





Results


Laboratory Results: 


 





 01/05/18 10:03 





 01/08/18 18:00 





 











  01/08/18





  18:00


 


Creatinine  0.87


 


Est GFR ( Amer)  > 60


 


Est GFR (Non-Af Amer)  > 60











Impressions: 


 





Head CT  01/01/18 07:24


IMPRESSION:  NO ACUTE INTRACRANIAL IMAGING FINDINGS.


EVIDENCE OF ACUTE STROKE: NO.


 








Chest X-Ray  01/01/18 11:11


IMPRESSION:  NO ACUTE RADIOGRAPHIC FINDING IN THE CHEST.  NO SIGNIFICANT CHANGE 

FROM PRIOR STUDY.


 








Tibia/Fibula X-Ray  01/01/18 13:12


IMPRESSION:  NO RADIOGRAPHIC EVIDENCE OF ACUTE INJURY.


 








Foot X-Ray  01/01/18 13:17


IMPRESSION:  NEGATIVE STUDY OF THE LEFT FOOT. NO RADIOGRAPHIC EVIDENCE OF ACUTE 

INJURY.


 








KUB X-Ray  01/02/18 00:00


IMPRESSION:


 


Nonspecific gastric distention.


 


 


 2011 [employer]- All Rights Reserved


 








Venous Doppler Study  01/03/18 00:00


IMPRESSION:  NO EVIDENCE OF DVT OR SVT IN THE LEFT LEG.


 














Assessment & Plan





- Diagnosis


(1) Cellulitis of left leg


Is this a current diagnosis for this admission?: Yes   


Plan: 


Continue present treatment








(2) Paroxysmal atrial fibrillation with rapid ventricular response


Is this a current diagnosis for this admission?: Yes   





(3) Morbid obesity


Is this a current diagnosis for this admission?: Yes

## 2018-01-09 NOTE — XCELERA REPORT
[address] [address]

                             Tel: [phone]

                             Fax: [phone]



                    Lower Extremity Arterial Evaluation

____________________________________________________________________________



Name: JOEL RUSSELL

MRN: [id number]                Age: 59 yrs

Gender: Male                   : 1958

Patient Status: Inpatient      Patient Location: [address] [place] [address]

Account #: [id number]

Study Date: 2018 11:18 AM

Accession #: [id number]

____________________________________________________________________________



Procedure: A color flow and duplex scan of the lower extremity arteries was

performed bilaterally with velocity and waveform anaylsis.

Reason For Study: BLE edema and pain





Ordering Physician: BUSTER BREWSTER

Performed By: Katie Madden

____________________________________________________________________________



____________________________________________________________________________





Measurements and Calculations



                                   Right         Left

  CFA PSV                          128.2        109.7    cm/sec

  Prox PFA PSV                     -150.9       -94.3    cm/sec

  Prox SFA PSV                     128.2        121.9    cm/sec

  Mid SFA PSV                      -108.7       -120.1   cm/sec

  Dist SFA PSV                     -75.1        -98.7    cm/sec

  Prox Pop A PSV                    60.4        100.6    cm/sec

  Dist JUHI PSV                      78.1        111.9    cm/sec

  Dist PTA PSV                      96.7        -97.2    cm/sec

  Arnaldo Pedis PSV                    102.6        117.3    cm/sec



____________________________________________________________________________



Right Side Arterial Evaluation

Normal velocity and triphasic waveforms noted from the Common Femoral

artery to the infrageniculate vessels.



0 % stenosis .



Ankle Brachial index not obtainable due to bandaging.



Left Side Arterial Evaluation

Normal velocity and triphasic waveforms noted from the Common Femoral

artery to the Anterior Tibial artery



0-19 % stenosis at the Deep Femoral and Dorsalis Pedis arteries.



Ankle Brachial index not obtainable due to open wounds.

____________________________________________________________________________



Interpretation Summary

Mild hemodynamically significant lesions in the bilateral lower

extremities, on duplex imaging, at rest.

____________________________________________________________________________



Electronically signed by:      Lennox Williams      on 2018 02:27 PM



CC: BUSTER BREWSTER

>

Williams, Lennox

## 2018-01-09 NOTE — PDOC PROGRESS REPORT
Subjective


Progress Note for:: 01/09/18


Subjective:: 





Patient is seen by the bedside no new complaints


Reason For Visit: 


AFIB AND HEART RATE -155








Physical Exam


Vital Signs: 


 











Temp Pulse Resp BP Pulse Ox


 


 98.5 F   83   20   104/45 L  94 


 


 01/09/18 19:09  01/09/18 19:09  01/09/18 19:09  01/09/18 19:09  01/09/18 19:09








 Intake & Output











 01/08/18 01/09/18 01/10/18





 06:59 06:59 06:59


 


Intake Total 895 3986 1426


 


Output Total 1500 1600 900


 


Balance -605 2386 526


 


Weight  118.9 kg 











General appearance: PRESENT: no acute distress


Eye exam: PRESENT: PERRLA


Respiratory exam: PRESENT: clear to auscultation lilli


Cardiovascular exam: PRESENT: +S1, +S2


Neurological exam: PRESENT: alert





Results


Laboratory Results: 


 





 01/09/18 05:16 





 01/09/18 05:16 





 











  01/08/18 01/08/18 01/09/18





  22:06 22:06 05:16


 


WBC  16.5 H   15.0 H


 


RBC  3.19 L   2.83 L


 


Hgb  9.4 L   8.5 L


 


Hct  29.1 L   25.8 L


 


MCV  91   91


 


MCH  29.4   29.8


 


MCHC  32.2   32.8


 


RDW  16.2 H   15.9 H


 


Plt Count  529 H   487 H


 


Seg Neutrophils %  Not Reportable   81.1 H


 


Lymphocytes %  Not Reportable   10.3 L


 


Monocytes %  Not Reportable   7.6


 


Eosinophils %  Not Reportable   0.4


 


Basophils %  Not Reportable   0.6


 


Absolute Neutrophils  Not Reportable   12.1 H


 


Absolute Lymphocytes  Not Reportable   1.5


 


Absolute Monocytes  Not Reportable   1.1


 


Absolute Eosinophils  Not Reportable   0.1


 


Absolute Basophils  Not Reportable   0.1


 


Sodium   136.7 L 


 


Potassium   4.7 


 


Chloride   105 


 


Carbon Dioxide   23 


 


Anion Gap   9 


 


BUN   13 


 


Creatinine   0.86 


 


Est GFR ( Amer)   > 60 


 


Est GFR (Non-Af Amer)   > 60 


 


Glucose   73 L 


 


Calcium   8.2 L 


 


Total Bilirubin   0.1 L 


 


AST   46 


 


ALT   44 


 


Alkaline Phosphatase   77 


 


Total Protein   5.5 L 


 


Albumin   2.4 L 














  01/09/18





  05:16


 


WBC 


 


RBC 


 


Hgb 


 


Hct 


 


MCV 


 


MCH 


 


MCHC 


 


RDW 


 


Plt Count 


 


Seg Neutrophils % 


 


Lymphocytes % 


 


Monocytes % 


 


Eosinophils % 


 


Basophils % 


 


Absolute Neutrophils 


 


Absolute Lymphocytes 


 


Absolute Monocytes 


 


Absolute Eosinophils 


 


Absolute Basophils 


 


Sodium  136.7 L


 


Potassium  4.6


 


Chloride  107


 


Carbon Dioxide  22


 


Anion Gap  8


 


BUN  15


 


Creatinine  0.90


 


Est GFR ( Amer)  > 60


 


Est GFR (Non-Af Amer)  > 60


 


Glucose  75


 


Calcium  7.7 L


 


Total Bilirubin  0.1 L


 


AST  41


 


ALT  40


 


Alkaline Phosphatase  72


 


Total Protein  5.3 L


 


Albumin  2.3 L











Impressions: 


 





Head CT  01/01/18 07:24


IMPRESSION:  NO ACUTE INTRACRANIAL IMAGING FINDINGS.


EVIDENCE OF ACUTE STROKE: NO.


 








Chest X-Ray  01/01/18 11:11


IMPRESSION:  NO ACUTE RADIOGRAPHIC FINDING IN THE CHEST.  NO SIGNIFICANT CHANGE 

FROM PRIOR STUDY.


 








Tibia/Fibula X-Ray  01/01/18 13:12


IMPRESSION:  NO RADIOGRAPHIC EVIDENCE OF ACUTE INJURY.


 








Foot X-Ray  01/01/18 13:17


IMPRESSION:  NEGATIVE STUDY OF THE LEFT FOOT. NO RADIOGRAPHIC EVIDENCE OF ACUTE 

INJURY.


 








KUB X-Ray  01/02/18 00:00


IMPRESSION:


 


Nonspecific gastric distention.


 


 


 [facility] [employer]- All Rights Reserved


 








Venous Doppler Study  01/03/18 00:00


IMPRESSION:  NO EVIDENCE OF DVT OR SVT IN THE LEFT LEG.


 














Assessment & Plan





- Diagnosis


(1) Cellulitis of left leg


Is this a current diagnosis for this admission?: Yes   





(2) Paroxysmal atrial fibrillation with rapid ventricular response


Is this a current diagnosis for this admission?: Yes   





(3) Morbid obesity


Is this a current diagnosis for this admission?: Yes

## 2018-01-10 NOTE — PDOC PROGRESS REPORT
Subjective


Progress Note for:: 01/10/18


Subjective:: 





Patient was seen by the bedside, he presented with severe cellulitis of the 

left leg, he was seen by the surgeon, he underwent excisional debridement of 

the left leg couples of days ago, today is the first time I will see a decrease 

in the swelling of the leg showing some response to treatment presently on dual

  antibiotic vancomycin and clindamycin, he will continue same treatment


Reason For Visit: 


AFIB AND HEART RATE -155








Physical Exam


Vital Signs: 


 











Temp Pulse Resp BP Pulse Ox


 


 99.1 F   78   18   114/57 L  93 


 


 01/10/18 16:07  01/10/18 16:07  01/10/18 16:07  01/10/18 16:07  01/10/18 16:07








 Intake & Output











 01/09/18 01/10/18 01/11/18





 06:59 06:59 06:59


 


Intake Total 3986 2276 1023


 


Output Total 1600 2100 875


 


Balance 2386 176 148


 


Weight 118.9 kg 117.5 kg 











General appearance: PRESENT: no acute distress


Eye exam: PRESENT: PERRLA


Respiratory exam: PRESENT: chest wall tenderness


Cardiovascular exam: PRESENT: +S1, +S2


GI/Abdominal exam: PRESENT: soft


Extremities exam: PRESENT: other - Swelling ,redness ,excoriation of the lower 

left leg





Results


Laboratory Results: 


 





 01/09/18 05:16 





 01/09/18 05:16 








Impressions: 


 





Head CT  01/01/18 07:24


IMPRESSION:  NO ACUTE INTRACRANIAL IMAGING FINDINGS.


EVIDENCE OF ACUTE STROKE: NO.


 








Chest X-Ray  01/01/18 11:11


IMPRESSION:  NO ACUTE RADIOGRAPHIC FINDING IN THE CHEST.  NO SIGNIFICANT CHANGE 

FROM PRIOR STUDY.


 








Tibia/Fibula X-Ray  01/01/18 13:12


IMPRESSION:  NO RADIOGRAPHIC EVIDENCE OF ACUTE INJURY.


 








Foot X-Ray  01/01/18 13:17


IMPRESSION:  NEGATIVE STUDY OF THE LEFT FOOT. NO RADIOGRAPHIC EVIDENCE OF ACUTE 

INJURY.


 








KUB X-Ray  01/02/18 00:00


IMPRESSION:


 


Nonspecific gastric distention.


 


 


 2011 [employer]- All Rights Reserved


 








Venous Doppler Study  01/03/18 00:00


IMPRESSION:  NO EVIDENCE OF DVT OR SVT IN THE LEFT LEG.


 














Assessment & Plan





- Diagnosis


(1) Cellulitis of left leg


Is this a current diagnosis for this admission?: Yes   


Plan: 


Continue IV antibiotic








(2) Paroxysmal atrial fibrillation with rapid ventricular response


Is this a current diagnosis for this admission?: Yes   





(3) Morbid obesity


Is this a current diagnosis for this admission?: Yes

## 2018-01-11 NOTE — PDOC PROGRESS REPORT
Subjective


Progress Note for:: 01/11/18


Subjective:: 





Patient seen by the bedside, he has no  IV access, presently on p.o. antibiotic


Reason For Visit: 


AFIB AND HEART RATE -155








Physical Exam


Vital Signs: 


 











Temp Pulse Resp BP Pulse Ox


 


 98.4 F   75   17   120/60   97 


 


 01/11/18 19:16  01/11/18 19:16  01/11/18 19:16  01/11/18 19:16  01/11/18 19:16








 Intake & Output











 01/10/18 01/11/18 01/12/18





 06:59 06:59 06:59


 


Intake Total 2276 1593 400


 


Output Total 2100 1675 375


 


Balance 176 -82 25


 


Weight 117.5 kg 119.7 kg 119.7 kg











General appearance: PRESENT: no acute distress


Eye exam: PRESENT: PERRLA


Respiratory exam: PRESENT: clear to auscultation lilli


Cardiovascular exam: PRESENT: +S1, +S2


GI/Abdominal exam: PRESENT: soft


Neurological exam: PRESENT: alert





Results


Laboratory Results: 


 





 01/09/18 05:16 





 01/09/18 05:16 








Impressions: 


 





Head CT  01/01/18 07:24


IMPRESSION:  NO ACUTE INTRACRANIAL IMAGING FINDINGS.


EVIDENCE OF ACUTE STROKE: NO.


 








Chest X-Ray  01/01/18 11:11


IMPRESSION:  NO ACUTE RADIOGRAPHIC FINDING IN THE CHEST.  NO SIGNIFICANT CHANGE 

FROM PRIOR STUDY.


 








Tibia/Fibula X-Ray  01/01/18 13:12


IMPRESSION:  NO RADIOGRAPHIC EVIDENCE OF ACUTE INJURY.


 








Foot X-Ray  01/01/18 13:17


IMPRESSION:  NEGATIVE STUDY OF THE LEFT FOOT. NO RADIOGRAPHIC EVIDENCE OF ACUTE 

INJURY.


 








KUB X-Ray  01/02/18 00:00


IMPRESSION:


 


Nonspecific gastric distention.


 


 


 2011 [employer]- All Rights Reserved


 








Venous Doppler Study  01/03/18 00:00


IMPRESSION:  NO EVIDENCE OF DVT OR SVT IN THE LEFT LEG.


 














Assessment & Plan





- Diagnosis


(1) Cellulitis of left leg


Is this a current diagnosis for this admission?: Yes   





(2) Paroxysmal atrial fibrillation with rapid ventricular response


Is this a current diagnosis for this admission?: Yes   





(3) Morbid obesity


Is this a current diagnosis for this admission?: Yes   





- Plan Summary


Plan Summary: 





Continue treatment

## 2018-01-12 NOTE — PDOC PROGRESS REPORT
Subjective


Progress Note for:: 01/12/18


Subjective:: 





Patient seen by the bedside still of severe cellulitis but improving now down 

graded to the floor


Reason For Visit: 


AFIB AND HEART RATE -155








Physical Exam


Vital Signs: 


 











Temp Pulse Resp BP Pulse Ox


 


 98.0 F   83   16   130/58 H  94 


 


 01/12/18 23:11  01/12/18 23:11  01/12/18 23:11  01/12/18 23:11  01/12/18 23:11








 Intake & Output











 01/11/18 01/12/18 01/13/18





 06:59 06:59 06:59


 


Intake Total 1593 718 709


 


Output Total 1677 6353 1650


 


Balance -82 -607 -941


 


Weight 119.7 kg 119.7 kg 











General appearance: PRESENT: no acute distress


Eye exam: PRESENT: PERRLA


Respiratory exam: PRESENT: clear to auscultation lilli


Cardiovascular exam: PRESENT: +S1, +S2


GI/Abdominal exam: PRESENT: soft


Extremities exam: PRESENT: other - Cellulitis of the left leg


Neurological exam: PRESENT: alert





Results


Laboratory Results: 


 





 01/09/18 05:16 





 01/09/18 05:16 








Impressions: 


 





Head CT  01/01/18 07:24


IMPRESSION:  NO ACUTE INTRACRANIAL IMAGING FINDINGS.


EVIDENCE OF ACUTE STROKE: NO.


 








Chest X-Ray  01/01/18 11:11


IMPRESSION:  NO ACUTE RADIOGRAPHIC FINDING IN THE CHEST.  NO SIGNIFICANT CHANGE 

FROM PRIOR STUDY.


 








Tibia/Fibula X-Ray  01/01/18 13:12


IMPRESSION:  NO RADIOGRAPHIC EVIDENCE OF ACUTE INJURY.


 








Foot X-Ray  01/01/18 13:17


IMPRESSION:  NEGATIVE STUDY OF THE LEFT FOOT. NO RADIOGRAPHIC EVIDENCE OF ACUTE 

INJURY.


 








KUB X-Ray  01/02/18 00:00


IMPRESSION:


 


Nonspecific gastric distention.


 


 


 2011 [employer]- All Rights Reserved


 








Venous Doppler Study  01/03/18 00:00


IMPRESSION:  NO EVIDENCE OF DVT OR SVT IN THE LEFT LEG.


 














Assessment & Plan





- Diagnosis


(1) Cellulitis of left leg


Is this a current diagnosis for this admission?: Yes   





(2) Paroxysmal atrial fibrillation with rapid ventricular response


Is this a current diagnosis for this admission?: Yes   





(3) Morbid obesity


Is this a current diagnosis for this admission?: Yes

## 2018-01-13 NOTE — PDOC PROGRESS REPORT
Subjective


Progress Note for:: 01/13/18


Subjective:: 





Patient was seen by the bedside, no new complaints


Reason For Visit: 


AFIB AND HEART RATE -155








Physical Exam


Vital Signs: 


 











Temp Pulse Resp BP Pulse Ox


 


 98.8 F   71   16   119/53 L  93 


 


 01/13/18 04:00  01/13/18 14:00  01/13/18 04:00  01/13/18 04:00  01/13/18 04:00








 Intake & Output











 01/12/18 01/13/18 01/14/18





 06:59 06:59 06:59


 


Intake Total 718 949 


 


Output Total 1325 2250 


 


Balance -607 -1301 


 


Weight 119.7 kg  











General appearance: PRESENT: no acute distress


Eye exam: PRESENT: PERRLA


Respiratory exam: PRESENT: clear to auscultation lilli


Cardiovascular exam: PRESENT: +S1, +S2


GI/Abdominal exam: PRESENT: soft


Extremities exam: PRESENT: other - erythema of the left leg


Neurological exam: PRESENT: alert





Results


Laboratory Results: 


 





 01/09/18 05:16 





 01/09/18 05:16 








Impressions: 


 





Head CT  01/01/18 07:24


IMPRESSION:  NO ACUTE INTRACRANIAL IMAGING FINDINGS.


EVIDENCE OF ACUTE STROKE: NO.


 








Chest X-Ray  01/01/18 11:11


IMPRESSION:  NO ACUTE RADIOGRAPHIC FINDING IN THE CHEST.  NO SIGNIFICANT CHANGE 

FROM PRIOR STUDY.


 








Tibia/Fibula X-Ray  01/01/18 13:12


IMPRESSION:  NO RADIOGRAPHIC EVIDENCE OF ACUTE INJURY.


 








Foot X-Ray  01/01/18 13:17


IMPRESSION:  NEGATIVE STUDY OF THE LEFT FOOT. NO RADIOGRAPHIC EVIDENCE OF ACUTE 

INJURY.


 








KUB X-Ray  01/02/18 00:00


IMPRESSION:


 


Nonspecific gastric distention.


 


 


 2011 [employer]- All Rights Reserved


 








Venous Doppler Study  01/03/18 00:00


IMPRESSION:  NO EVIDENCE OF DVT OR SVT IN THE LEFT LEG.


 














Assessment & Plan





- Diagnosis


(1) Cellulitis of left leg


Is this a current diagnosis for this admission?: Yes   





(2) Paroxysmal atrial fibrillation with rapid ventricular response


Is this a current diagnosis for this admission?: Yes   





(3) Morbid obesity


Is this a current diagnosis for this admission?: Yes

## 2018-01-14 NOTE — PDOC PROGRESS REPORT
Subjective


Progress Note for:: 01/14/18


Subjective:: 





Patient was seen by the bedside, no new complaints


Reason For Visit: 


AFIB AND HEART RATE -155








Physical Exam


Vital Signs: 


 











Temp Pulse Resp BP Pulse Ox


 


 98.1 F   90   19   109/55 L  91 L


 


 01/14/18 16:08  01/14/18 16:08  01/14/18 16:08  01/14/18 16:08  01/14/18 16:08








 Intake & Output











 01/13/18 01/14/18 01/15/18





 06:59 06:59 06:59


 


Intake Total 949 1002 680


 


Output Total 2250 1750 1400


 


Balance -1301 -748 -720











General appearance: PRESENT: no acute distress, well-developed, well-nourished


Head exam: PRESENT: atraumatic, normocephalic


Eye exam: PRESENT: conjunctiva pink, EOMI, PERRLA


Mouth exam: PRESENT: moist, tongue midline


Neck exam: PRESENT: full ROM


Respiratory exam: PRESENT: clear to auscultation lilli


Cardiovascular exam: PRESENT: RRR, +S1, +S2


Murmur grade: 3


Pulses: PRESENT: normal dorsalis pedis pul, +2 pedal pulses bilateral


Vascular exam: PRESENT: normal capillary refill


GI/Abdominal exam: PRESENT: normal bowel sounds, soft.  ABSENT: distended, 

guarding, mass, organolmegaly, rebound, tenderness


Rectal exam: PRESENT: deferred


Neurological exam: PRESENT: alert


Psychiatric exam: PRESENT: appropriate affect, normal mood


Skin exam: PRESENT: dry, intact, warm.  ABSENT: cyanosis, rash





Results


Laboratory Results: 


 





 01/14/18 15:25 





 01/14/18 15:25 





 











  01/14/18 01/14/18





  15:25 15:25


 


WBC  11.2 H 


 


RBC  3.06 L 


 


Hgb  9.0 L 


 


Hct  27.3 L 


 


MCV  89 


 


MCH  29.5 


 


MCHC  33.0 


 


RDW  15.5 H 


 


Plt Count  443 


 


Seg Neutrophils %  82.6 H 


 


Lymphocytes %  9.8 L 


 


Monocytes %  5.6 


 


Eosinophils %  0.1 


 


Basophils %  1.9 


 


Absolute Neutrophils  9.2 H 


 


Absolute Lymphocytes  1.1 


 


Absolute Monocytes  0.6 


 


Absolute Eosinophils  0.0 


 


Absolute Basophils  0.2 


 


Sodium   139.4


 


Potassium   4.1


 


Chloride   106


 


Carbon Dioxide   26


 


Anion Gap   7


 


BUN   11


 


Creatinine   0.87


 


Est GFR ( Amer)   > 60


 


Est GFR (Non-Af Amer)   > 60


 


Glucose   82


 


Calcium   9.0


 


Total Bilirubin   0.3


 


AST   18


 


ALT   28


 


Alkaline Phosphatase   57


 


Total Protein   6.1 L


 


Albumin   2.6 L











Impressions: 


 





Head CT  01/01/18 07:24


IMPRESSION:  NO ACUTE INTRACRANIAL IMAGING FINDINGS.


EVIDENCE OF ACUTE STROKE: NO.


 








Chest X-Ray  01/01/18 11:11


IMPRESSION:  NO ACUTE RADIOGRAPHIC FINDING IN THE CHEST.  NO SIGNIFICANT CHANGE 

FROM PRIOR STUDY.


 








Tibia/Fibula X-Ray  01/01/18 13:12


IMPRESSION:  NO RADIOGRAPHIC EVIDENCE OF ACUTE INJURY.


 








Foot X-Ray  01/01/18 13:17


IMPRESSION:  NEGATIVE STUDY OF THE LEFT FOOT. NO RADIOGRAPHIC EVIDENCE OF ACUTE 

INJURY.


 








KUB X-Ray  01/02/18 00:00


IMPRESSION:


 


Nonspecific gastric distention.


 


 


 2011 [employer]- All Rights Reserved


 








Venous Doppler Study  01/03/18 00:00


IMPRESSION:  NO EVIDENCE OF DVT OR SVT IN THE LEFT LEG.


 














Assessment & Plan





- Diagnosis


(1) Cellulitis of left leg


Is this a current diagnosis for this admission?: Yes   





(2) Paroxysmal atrial fibrillation with rapid ventricular response


Is this a current diagnosis for this admission?: Yes   





(3) Morbid obesity


Is this a current diagnosis for this admission?: Yes

## 2018-01-15 NOTE — PDOC PROGRESS REPORT
Subjective


Progress Note for:: 01/15/18


Subjective:: 





Patient is seen by the bedside he has no new complaints


Reason For Visit: 


AFIB AND HEART RATE -155








Physical Exam


Vital Signs: 


 











Temp Pulse Resp BP Pulse Ox


 


 98.3 F   76   20   115/71   94 


 


 01/15/18 16:11  01/15/18 19:00  01/15/18 16:11  01/15/18 16:11  01/15/18 16:11








 Intake & Output











 01/14/18 01/15/18 01/16/18





 06:59 06:59 06:59


 


Intake Total 1002 980 330


 


Output Total 1750 1850 500


 


Balance -748 -870 -170











Head exam: PRESENT: atraumatic, normocephalic


Eye exam: PRESENT: PERRLA.  ABSENT: scleral icterus


Ear exam: PRESENT: normal external ear exam


Mouth exam: PRESENT: moist, tongue midline


Neck exam: PRESENT: full ROM


Respiratory exam: PRESENT: clear to auscultation lilli


Cardiovascular exam: PRESENT: RRR, +S1, +S2


Murmur grade: 3


Pulses: PRESENT: normal dorsalis pedis pul, +2 pedal pulses bilateral


Vascular exam: PRESENT: normal capillary refill


GI/Abdominal exam: PRESENT: normal bowel sounds, soft.  ABSENT: distended, 

guarding, mass, organolmegaly, rebound, tenderness


Rectal exam: PRESENT: deferred


Extremities exam: PRESENT: other - Erythema the left leg


Neurological exam: PRESENT: alert.  ABSENT: motor sensory deficit


Skin exam: ABSENT: cyanosis, rash





Results


Laboratory Results: 


 





 01/15/18 05:33 





 01/15/18 05:32 





 











  01/15/18 01/15/18





  05:32 05:33


 


WBC   9.7


 


RBC   2.98 L


 


Hgb   9.0 L


 


Hct   26.6 L


 


MCV   89


 


MCH   30.1


 


MCHC   33.7


 


RDW   15.1 H


 


Plt Count   438


 


Seg Neutrophils %   76.1


 


Lymphocytes %   15.5


 


Monocytes %   7.3


 


Eosinophils %   0.4


 


Basophils %   0.7


 


Absolute Neutrophils   7.4


 


Absolute Lymphocytes   1.5


 


Absolute Monocytes   0.7


 


Absolute Eosinophils   0.0


 


Absolute Basophils   0.1


 


Sodium  139.6 


 


Potassium  4.2 


 


Chloride  106 


 


Carbon Dioxide  26 


 


Anion Gap  8 


 


BUN  11 


 


Creatinine  0.91 


 


Est GFR ( Amer)  > 60 


 


Est GFR (Non-Af Amer)  > 60 


 


Glucose  74 L 


 


Calcium  8.9 


 


Total Bilirubin  0.3 


 


AST  20 


 


ALT  20 L 


 


Alkaline Phosphatase  56 


 


Total Protein  6.2 L 


 


Albumin  2.7 L 











Impressions: 


 





Head CT  01/01/18 07:24


IMPRESSION:  NO ACUTE INTRACRANIAL IMAGING FINDINGS.


EVIDENCE OF ACUTE STROKE: NO.


 








Chest X-Ray  01/01/18 11:11


IMPRESSION:  NO ACUTE RADIOGRAPHIC FINDING IN THE CHEST.  NO SIGNIFICANT CHANGE 

FROM PRIOR STUDY.


 








Tibia/Fibula X-Ray  01/01/18 13:12


IMPRESSION:  NO RADIOGRAPHIC EVIDENCE OF ACUTE INJURY.


 








Foot X-Ray  01/01/18 13:17


IMPRESSION:  NEGATIVE STUDY OF THE LEFT FOOT. NO RADIOGRAPHIC EVIDENCE OF ACUTE 

INJURY.


 








KUB X-Ray  01/02/18 00:00


IMPRESSION:


 


Nonspecific gastric distention.


 


 


 2011 [employer]- All Rights Reserved


 








Venous Doppler Study  01/03/18 00:00


IMPRESSION:  NO EVIDENCE OF DVT OR SVT IN THE LEFT LEG.


 














Assessment & Plan





- Diagnosis


(1) Cellulitis of left leg


Is this a current diagnosis for this admission?: Yes   





(2) Paroxysmal atrial fibrillation with rapid ventricular response


Is this a current diagnosis for this admission?: Yes   





(3) Morbid obesity


Is this a current diagnosis for this admission?: Yes   





- Plan Summary


Plan Summary: 





Continue treatment

## 2018-01-16 NOTE — PDOC PROGRESS REPORT
Subjective


Progress Note for:: 01/16/18


Subjective:: 





Patient seen by the bedside, there is improvement in the cellulitis, there is a 

lot of slough but overall is better


Reason For Visit: 


AFIB AND HEART RATE -155








Physical Exam


Vital Signs: 


 











Temp Pulse Resp BP Pulse Ox


 


 98.2 F   81   14   100/48 L  94 


 


 01/16/18 19:50  01/16/18 19:50  01/16/18 19:50  01/16/18 19:50  01/16/18 19:50








 Intake & Output











 01/15/18 01/16/18 01/17/18





 06:59 06:59 06:59


 


Intake Total 980 791 300


 


Output Total 1850 1980 200


 


Balance -870 -1189 100











General appearance: PRESENT: no acute distress


Head exam: PRESENT: atraumatic, normocephalic


Eye exam: PRESENT: conjunctiva pink, EOMI, PERRLA


Ear exam: PRESENT: normal external ear exam


Mouth exam: PRESENT: moist, tongue midline


Neck exam: PRESENT: full ROM


Cardiovascular exam: PRESENT: RRR, +S1, +S2


Murmur grade: 3


Vascular exam: PRESENT: normal capillary refill


GI/Abdominal exam: PRESENT: normal bowel sounds, soft


Rectal exam: PRESENT: deferred


Neurological exam: PRESENT: alert


Psychiatric exam: PRESENT: appropriate affect.  ABSENT: homicidal ideation, 

suicidal ideation


Skin exam: PRESENT: dry, intact, warm.  ABSENT: cyanosis, rash





Results


Laboratory Results: 


 





 01/15/18 05:33 





 01/15/18 05:32 








Impressions: 


 





Head CT  01/01/18 07:24


IMPRESSION:  NO ACUTE INTRACRANIAL IMAGING FINDINGS.


EVIDENCE OF ACUTE STROKE: NO.


 








Chest X-Ray  01/01/18 11:11


IMPRESSION:  NO ACUTE RADIOGRAPHIC FINDING IN THE CHEST.  NO SIGNIFICANT CHANGE 

FROM PRIOR STUDY.


 








Tibia/Fibula X-Ray  01/01/18 13:12


IMPRESSION:  NO RADIOGRAPHIC EVIDENCE OF ACUTE INJURY.


 








Foot X-Ray  01/01/18 13:17


IMPRESSION:  NEGATIVE STUDY OF THE LEFT FOOT. NO RADIOGRAPHIC EVIDENCE OF ACUTE 

INJURY.


 








KUB X-Ray  01/02/18 00:00


IMPRESSION:


 


Nonspecific gastric distention.


 


 


 2011 [employer]- All Rights Reserved


 








Venous Doppler Study  01/03/18 00:00


IMPRESSION:  NO EVIDENCE OF DVT OR SVT IN THE LEFT LEG.


 














Assessment & Plan





- Diagnosis


(1) Cellulitis of left leg


Is this a current diagnosis for this admission?: Yes   





(2) Paroxysmal atrial fibrillation with rapid ventricular response


Is this a current diagnosis for this admission?: Yes   





(3) Morbid obesity


Is this a current diagnosis for this admission?: Yes

## 2018-01-18 NOTE — PDOC PROGRESS REPORT
Subjective


Progress Note for:: 01/17/18


Subjective:: 


Patient is improving on present regimen


Reason For Visit: 


AFIB AND HEART RATE -155








Physical Exam


Vital Signs: 


 











Temp Pulse Resp BP Pulse Ox


 


 98.2 F   73   16   105/52 L  96 


 


 01/18/18 16:00  01/18/18 16:00  01/18/18 16:00  01/18/18 16:00  01/18/18 16:00








 Intake & Output











 01/17/18 01/18/18 01/19/18





 06:59 06:59 06:59


 


Intake Total 903 730 660


 


Output Total 1275 3978 1900


 


Balance -372 -1249 -1247











General appearance: PRESENT: no acute distress, well-developed, well-nourished


Head exam: PRESENT: atraumatic, normocephalic


Eye exam: PRESENT: conjunctiva pink, EOMI, PERRLA


Mouth exam: PRESENT: moist, tongue midline


Neck exam: PRESENT: full ROM


Respiratory exam: PRESENT: clear to auscultation lilli


Cardiovascular exam: PRESENT: RRR, +S1, +S2


Murmur grade: 3


Pulses: PRESENT: normal dorsalis pedis pul, +2 pedal pulses bilateral


Vascular exam: PRESENT: normal capillary refill


GI/Abdominal exam: PRESENT: normal bowel sounds, soft


Rectal exam: PRESENT: deferred


Neurological exam: PRESENT: alert


Psychiatric exam: PRESENT: appropriate affect, normal mood


Skin exam: PRESENT: dry, intact, warm





Results


Laboratory Results: 


 





 01/15/18 05:33 





 01/15/18 05:32 








Impressions: 


 





Head CT  01/01/18 07:24


IMPRESSION:  NO ACUTE INTRACRANIAL IMAGING FINDINGS.


EVIDENCE OF ACUTE STROKE: NO.


 








Chest X-Ray  01/01/18 11:11


IMPRESSION:  NO ACUTE RADIOGRAPHIC FINDING IN THE CHEST.  NO SIGNIFICANT CHANGE 

FROM PRIOR STUDY.


 








Tibia/Fibula X-Ray  01/01/18 13:12


IMPRESSION:  NO RADIOGRAPHIC EVIDENCE OF ACUTE INJURY.


 








Foot X-Ray  01/01/18 13:17


IMPRESSION:  NEGATIVE STUDY OF THE LEFT FOOT. NO RADIOGRAPHIC EVIDENCE OF ACUTE 

INJURY.


 








KUB X-Ray  01/02/18 00:00


IMPRESSION:


 


Nonspecific gastric distention.


 


 


 2011 [employer]- All Rights Reserved


 








Venous Doppler Study  01/03/18 00:00


IMPRESSION:  NO EVIDENCE OF DVT OR SVT IN THE LEFT LEG.


 














Assessment & Plan





- Diagnosis


(1) Cellulitis of left leg


Is this a current diagnosis for this admission?: Yes   





(2) Paroxysmal atrial fibrillation with rapid ventricular response


Is this a current diagnosis for this admission?: Yes   





(3) Morbid obesity


Is this a current diagnosis for this admission?: Yes

## 2018-01-18 NOTE — PDOC PROGRESS REPORT
Subjective


Progress Note for:: 01/18/18


Subjective:: 


Patient is improving on present regimen, continue present treatment


Reason For Visit: 


AFIB AND HEART RATE -155








Physical Exam


Vital Signs: 


 











Temp Pulse Resp BP Pulse Ox


 


 98.2 F   73   16   105/52 L  96 


 


 01/18/18 16:00  01/18/18 16:00  01/18/18 16:00  01/18/18 16:00  01/18/18 16:00








 Intake & Output











 01/17/18 01/18/18 01/19/18





 06:59 06:59 06:59


 


Intake Total 903 730 660


 


Output Total 1275 1975 1900


 


Balance -372 -1245 -1240











General appearance: PRESENT: no acute distress


Head exam: PRESENT: atraumatic, normocephalic


Ear exam: PRESENT: normal external ear exam


Mouth exam: PRESENT: moist, tongue midline


Neck exam: PRESENT: full ROM


Respiratory exam: PRESENT: clear to auscultation lilli


Cardiovascular exam: PRESENT: RRR, +S1, +S2


Murmur grade: 3


Pulses: PRESENT: normal dorsalis pedis pul, +2 pedal pulses bilateral


Vascular exam: PRESENT: normal capillary refill


GI/Abdominal exam: PRESENT: normal bowel sounds, soft


Rectal exam: PRESENT: deferred


Neurological exam: PRESENT: alert


Psychiatric exam: PRESENT: appropriate affect, normal mood


Skin exam: PRESENT: dry, intact, warm





Results


Laboratory Results: 


 





 01/15/18 05:33 





 01/15/18 05:32 








Impressions: 


 





Head CT  01/01/18 07:24


IMPRESSION:  NO ACUTE INTRACRANIAL IMAGING FINDINGS.


EVIDENCE OF ACUTE STROKE: NO.


 








Chest X-Ray  01/01/18 11:11


IMPRESSION:  NO ACUTE RADIOGRAPHIC FINDING IN THE CHEST.  NO SIGNIFICANT CHANGE 

FROM PRIOR STUDY.


 








Tibia/Fibula X-Ray  01/01/18 13:12


IMPRESSION:  NO RADIOGRAPHIC EVIDENCE OF ACUTE INJURY.


 








Foot X-Ray  01/01/18 13:17


IMPRESSION:  NEGATIVE STUDY OF THE LEFT FOOT. NO RADIOGRAPHIC EVIDENCE OF ACUTE 

INJURY.


 








KUB X-Ray  01/02/18 00:00


IMPRESSION:


 


Nonspecific gastric distention.


 


 


 2011 [employer]- All Rights Reserved


 








Venous Doppler Study  01/03/18 00:00


IMPRESSION:  NO EVIDENCE OF DVT OR SVT IN THE LEFT LEG.


 














Assessment & Plan





- Diagnosis


(1) Cellulitis of left leg


Is this a current diagnosis for this admission?: Yes   





(2) Paroxysmal atrial fibrillation with rapid ventricular response


Is this a current diagnosis for this admission?: Yes   





(3) Morbid obesity


Is this a current diagnosis for this admission?: Yes

## 2018-01-19 NOTE — PDOC TRANSFER SUMMARY
General





- Admit/Disc Date/PCP


Admission Date/Primary Care Provider: 


  01/01/18 14:28





  NATASHA TAVERAS MD





Discharge Date: 01/19/18





- Discharge Diagnosis


(1) Cellulitis of left leg


Is this a current diagnosis for this admission?: Yes   





(2) Paroxysmal atrial fibrillation with rapid ventricular response


Is this a current diagnosis for this admission?: Yes   





(3) Morbid obesity


Is this a current diagnosis for this admission?: Yes   





- Additional Information


Resuscitation Status: Full Code


Discharge Diet: Regular, Diabetic


Discharge Activity: Activity As Tolerated


Prescriptions: 


Clindamycin HCl [Cleocin 150 mg Capsule] 300 mg PO Q6 #28 capsule


Metoprolol Succinate [Toprol  mg Tablet] 100 mg PO DAILY #90 tab.sr.24h


Silver Sulfadiazine [Silvadene 1% Cream 400 gm] 1 applic TP BID #1 jar


Home Medications: 








Acetaminophen [Tylenol 325 mg Tablet] 650 mg PO Q4HP PRN 01/01/18 


Acetaminophen [Tylenol 325 mg Tablet] 650 mg PO Q8 01/01/18 


Benztropine Mesylate 1 mg PO BID 01/01/18 


Bismuth Subsalicylate [Bismuth] 15 ml PO Q4HP PRN 01/01/18 


Bupropion HCl [Wellbutrin 75 mg Tablet] 75 mg PO DAILY 01/01/18 


Calcium Carbonate [Tums Chewable 500 mg Tab.chew] 1,000 mg PO Q1HP PRN 01/01/18 


Diazepam [Valium 5 mg Tablet] 5 mg PO Q8HP PRN 01/01/18 


Divalproex Sodium [Depakote] 1,000 mg PO QHS 01/01/18 


Divalproex Sodium [Depakote] 500 mg PO DAILY 01/01/18 


Doxepin HCl [Silenor] 3 mg PO QHS 01/01/18 


Ergocalciferol (Vitamin D2) [Drisdol 50,000 unit (1.25MG) Capsule] 50,000 unit 

PO MO@1000 01/01/18 


Lubiprostone [Amitiza] 24 mcg PO BID 01/01/18 


Mag Hydrox/Al Hydrox/Simeth [Maalox Plus Susp 30 Udcup] 30 ml PO Q4HP PRN 01/01/ 18 


Magnesium Hydroxide [Milk of Magnesia 30 ml Udcup] 30 ml PO BIDP PRN 01/01/18 


Melatonin [Melatin] 3 mg PO QHS 01/01/18 


Metformin HCl [Glucophage] 1,000 mg PO BIDBS 01/01/18 


Methylcellulose [Fiber] 1,000 mg PO BID 01/01/18 


Olanzapine [Olanzapine Odt] 15 mg SL BID 01/01/18 


Propylene Glycol/Peg 400/Pf [Systane 0.3-0.4% Eye Drops] 1 drop OU QID 01/01/18 


Rosuvastatin Calcium [Crestor 20 mg Tablet] 20 mg PO DAILY 01/01/18 


Sennosides [Senna] 8.6 mg PO DAILYP PRN 01/01/18 


Tamsulosin HCl [Flomax 0.4 mg Cap.sr] 0.4 mg PO DAILY 01/01/18 


Zolpidem Tartrate [Ambien] 10 mg PO QHS 01/01/18 


Clindamycin HCl [Cleocin 150 mg Capsule] 300 mg PO Q6 #28 capsule 01/19/18 


Metoprolol Succinate [Toprol  mg Tablet] 100 mg PO DAILY #90 tab.sr.24h 01 /19/18 


Silver Sulfadiazine [Silvadene 1% Cream 400 gm] 1 applic TP BID #1 jar 01/19/18 











History of Present Illness


Admission Date/PCP: 


  01/01/18 14:28





  NATASHA TAVERAS MD





History of Present Illness: 





Patient was admitted when he presented with severe cellulitis of the left leg





Hospital Course


Hospital Course: 





He was admitted for the management of severe cellulitis of the left leg, he was 

treated with IV antibiotic, clindamycin, Zosyn and vancomycin, he developed  

paroxysmal atrial fibrillation with rapid ventricular response requiring 

Cardizem infusion for rate control.  He was seen by the surgeon because of 

necrosis subsequently cellulitis he underwent excisional debridement by the 

surgeon.  He also had topical Silvadene cream application the left leg.  Venous 

Doppler of the left leg was done there was no deep vein thrombosis establish on 

ultrasound.  Patient may need chronic anticoagulation because of the atrial 

fibrillation, this would be reevaluated outpatient when because of follow-up.  

He has multiple medication for his schizoaffective disorder.





Physical Exam


Vital Signs: 


 











Temp Pulse Resp BP Pulse Ox


 


 98.3 F   78   18   101/48 L  96 


 


 01/19/18 16:00  01/19/18 16:00  01/19/18 16:00  01/19/18 16:00  01/19/18 16:00








 Intake & Output











 01/18/18 01/19/18 01/20/18





 06:59 06:59 06:59


 


Intake Total 730 1210 540


 


Output Total 8683 3100 300


 


Balance -1245 -1890 240











General appearance: PRESENT: no acute distress, well-developed, well-nourished


Head exam: PRESENT: atraumatic, normocephalic


Eye exam: PRESENT: conjunctiva pink, EOMI, PERRLA


Ear exam: PRESENT: normal external ear exam


Mouth exam: PRESENT: moist, tongue midline


Respiratory exam: PRESENT: clear to auscultation lilli


Cardiovascular exam: PRESENT: RRR, +S1, +S2


Pulses: PRESENT: normal dorsalis pedis pul


Vascular exam: PRESENT: normal capillary refill


GI/Abdominal exam: PRESENT: normal bowel sounds, soft


Rectal exam: PRESENT: deferred


Extremities exam: PRESENT: full ROM, tenderness, +1 edema, other - Left leg 

erythema


Neurological exam: PRESENT: alert


Psychiatric exam: PRESENT: appropriate affect, normal mood


Skin exam: PRESENT: dry, intact, warm





Results


Laboratory Results: 


 





 01/15/18 05:33 





 01/15/18 05:32 








Impressions: 


 





Head CT  01/01/18 07:24


IMPRESSION:  NO ACUTE INTRACRANIAL IMAGING FINDINGS.


EVIDENCE OF ACUTE STROKE: NO.


 








Chest X-Ray  01/01/18 11:11


IMPRESSION:  NO ACUTE RADIOGRAPHIC FINDING IN THE CHEST.  NO SIGNIFICANT CHANGE 

FROM PRIOR STUDY.


 








Tibia/Fibula X-Ray  01/01/18 13:12


IMPRESSION:  NO RADIOGRAPHIC EVIDENCE OF ACUTE INJURY.


 








Foot X-Ray  01/01/18 13:17


IMPRESSION:  NEGATIVE STUDY OF THE LEFT FOOT. NO RADIOGRAPHIC EVIDENCE OF ACUTE 

INJURY.


 








KUB X-Ray  01/02/18 00:00


IMPRESSION:


 


Nonspecific gastric distention.


 


 


 2011 [employer]- All Rights Reserved


 








Venous Doppler Study  01/03/18 00:00


IMPRESSION:  NO EVIDENCE OF DVT OR SVT IN THE LEFT LEG.

## 2018-02-10 NOTE — ER DOCUMENT REPORT
ED Extremity Problem, Lower





- General


Chief Complaint: Leg Pain


Stated Complaint: LEFT LEG PAIN


Time Seen by Provider: 02/10/18 20:09


Notes: 


Patient is a 59-year-old male that comes emergency department for chief 

complaint of pain in his left lower extremity with spreading redness.  Patient 

has chronic wounds that are bandaged regularly he reports, he comes by EMS from 

[facility].  He states he was started on 

antibiotics several days ago but it does not seem to be helping.  He denies 

fever.  Past medical history of type 2 diabetes, schizophrenia.  His records 

indicate he was started on clindamycin 4 days ago.





TRAVEL OUTSIDE OF THE [place] IN LAST 30 DAYS: No





- Related Data


Allergies/Adverse Reactions: 


 





Penicillins Allergy (Verified 08/11/16 11:27)


 











Past Medical History





- General


Information source: Patient





- Social History


Smoking Status: Never Smoker


Frequency of alcohol use: None


Drug Abuse: None


Lives with: [facility]


Family History: Reviewed & Not Pertinent


Patient has suicidal ideation: No


Patient has homicidal ideation: No





- Past Medical History


Cardiac Medical History: Reports: Hx Hypercholesterolemia, Hx Hypertension


Pulmonary Medical History: Reports: Hx Pneumonia - "Neurocognitive Disorder"


Endocrine Medical History: Reports: Hx Diabetes Mellitus Type 2


Renal/ Medical History: Denies: Hx Peritoneal Dialysis


Psychiatric Medical History: Reports: Hx Depression


Past Surgical History: Reports: Hx Orthopedic Surgery - Left Knee





- Immunizations


Immunizations up to date: Yes


Hx Diphtheria, Pertussis, Tetanus Vaccination: No





Review of Systems





- Review of Systems


Constitutional: No symptoms reported


EENT: No symptoms reported


Cardiovascular: No symptoms reported


Respiratory: No symptoms reported


Gastrointestinal: No symptoms reported


Genitourinary: No symptoms reported


Male Genitourinary: No symptoms reported


Musculoskeletal: See HPI


Skin: See HPI


Hematologic/Lymphatic: No symptoms reported


Neurological/Psychological: No symptoms reported





Physical Exam





- Vital signs


Vitals: 


 











Temp Pulse Resp BP Pulse Ox


 


 98.1 F   70   17   121/58 L  94 


 


 02/11/18 01:11  02/11/18 01:11  02/11/18 01:11  02/11/18 01:11  02/11/18 01:11











Interpretation: Normal





- General


General appearance: Appears well, Alert


In distress: None - Alert, conversational, well-appearing





- HEENT


Head: Normocephalic, Atraumatic


Eyes: Normal


Pupils: PERRL





- Respiratory


Respiratory status: No respiratory distress


Chest status: Nontender


Breath sounds: Normal.  No: Decreased air movement, Wheezing


Chest palpation: Normal





- Cardiovascular


Rhythm: Regular.  No: Tachycardia


Heart sounds: Normal auscultation, S1 appreciated, S2 appreciated


Murmur: No





- Abdominal


Inspection: Normal


Distension: No distension


Bowel sounds: Normal


Tenderness: Nontender


Organomegaly: No organomegaly





- Back


Back: Normal, Nontender





- Extremities


General upper extremity: Normal inspection, Nontender, Normal color, Normal ROM

, Normal temperature


General lower extremity: Other - Erythema and warmth consistent with cellulitis 

extending from the left lateral distal tibia, slightly over the top of the foot

, and extending to the proximal tibial area but not including the knee.  Normal 

range of motion in the knee and ankle.  No lymphangitis, no induration or 

fluctuance, no purulent drainage.  No significant pain, patient can stand and 

walk without difficulty.





- Neurological


Neuro grossly intact: Yes


Cognition: Normal


Orientation: AAOx4


Seligman Coma Scale Eye Opening: Spontaneous


Cesar Coma Scale Verbal: Oriented


Seligman Coma Scale Motor: Obeys Commands


Seligman Coma Scale Total: 15


Speech: Normal


Motor strength normal: LUE, RUE, LLE, RLE


Sensory: Normal





- Psychological


Associated symptoms: Normal affect, Normal mood





- Skin


Skin Temperature: Warm


Skin Moisture: Dry


Skin Color: Normal





Course





- Re-evaluation


Re-evalutation: 


Patient has small skin openings on the left lower tibial areas, appear to have 

become infected with cellulitis, no induration or fluctuance, no purulent 

discharge.  Area is not significantly tender.  Cellulitis does extend to the 

top of the foot and up towards the proximal tibia but not including the knee, 

ankle, and no evidence of lymphangitis.  Patient is afebrile, normal vital signs

, no leukocytosis.





Because patient is already started on antibiotics outpatient I called and spoke 

with Dr. Taveras.  Discussed admission for antibiotics versus changing his 

antibiotics for better coverage and close follow-up.  His recommendation at 

this time is to have patient on Bactrim and Keflex and have a close follow-up 

performed with strict return precautions.  These were discussed with patient.  

Patient states preference for this plan, states he does not want to be admitted 

to the hospital and will return if he worsens in any way.  Given doses of 

antibiotics here tonight.





- Vital Signs


Vital signs: 


 











Temp Pulse Resp BP Pulse Ox


 


 98.1 F   70   17   121/58 L  94 


 


 02/11/18 01:11  02/11/18 01:11  02/11/18 01:11  02/11/18 01:11  02/11/18 01:11














- Laboratory


Result Diagrams: 


 02/10/18 21:10





 02/10/18 21:10


Laboratory results interpreted by me: 


 











  02/10/18





  21:10


 


RBC  2.91 L


 


Hgb  8.7 L


 


Hct  25.9 L


 


RDW  16.6 H














Discharge





- Discharge


Clinical Impression: 


 Cellulitis of left leg





Condition: Stable


Disposition: HOME, SELF-CARE


Additional Instructions: 


Your workup does not show any concerning abnormalities. 


Your exam shows cellulitis, a skin infection in your leg. 


I have spoken with Dr. Cale vines, the plan is to change your antibiotic 

treatment to Bactrim and Keflex, stop the clindamycin, please have your leg 

rechecked within the next 24-48 hours with your primary care. Keep leg clean, 

elevate when possible. Return to emergency department if you worsen including 

spreading up the leg, discolored discharge, fever of 100.4 or greater, or any 

other concerning symptoms.


Prescriptions: 


Cephalexin Monohydrate [Keflex 500 mg Capsule] 500 mg PO QID #28 capsule


Sulfamethoxazole/Trimethoprim [Bactrim Ds Tablet] 1 each PO BID #14 tablet


Referrals: 


NATASHA TAVERAS MD [Primary Care Provider] - 02/12/18

## 2018-02-22 NOTE — OPERATIVE REPORT E
Operative Report



NAME: JOEL RUSSELL

MRN:  [id number]          : 1958 AGE:  59Y

DATE OF SURGERY: 2018               ROOM: [id number]



PREOPERATIVE DIAGNOSIS:

Exfoliating bulla left lower extremity.



POSTOPERATIVE DIAGNOSIS:

Exfoliating bulla left lower extremity.



PROCEDURE:

Excision and debridement of bulla.



SURGEON:

JORGE L DAVE M.D.



ANESTHESIA:

None.



COMPLICATIONS:

None.



FINDINGS:

See below.



ESTIMATED BLOOD LOSS:

Scant.



SUMMARY OF PROCEDURE:

Patient's left lower extremity was examined.  Surgical timeout was

conducted.  The leg was washed with soapy water, and exfoliating,necrotic

tissue consisting of bulla was excisionally debrided using pickups and

scissors.  Wound was washed and redressed.  Total amount of dead tissue

removed 1 g.



DICTATING PHYSICIAN:  JORGE L DAVE M.D.





[id number]                  DT: [id number]

PHY#: [id number]            DD: [id number]

ID:   [id number]           JOB#: [id number]       ACCT: [id number]



cc:JORGE L DAVE M.D.

>

## 2018-04-10 NOTE — XCELERA REPORT
[address] [address]

                             Tel: [phone]

                             Fax: [phone]



                     Lower Extremity Venous Evaluation

____________________________________________________________________________



Name: JOEL RUSSELL

MRN: [id number]                Age: 60 yrs

Gender: Male                   : 1958

Patient Status: Outpatient     Patient Location: 

Account #: [id number]

Study Date: 04/10/2018 09:29 AM

Accession #: [id number]

____________________________________________________________________________



Procedure: A bilateral duplex scan of the lower extremity veins was

performed. The evaluation included responses to compression and other

maneuvers with patient in the supine and standing positions to assess

venous insufficiency.

Reason For Study: ULCER





Ordering Physician: JESSI ROWE

Performed By: Elias Hester

____________________________________________________________________________



____________________________________________________________________________





Right Sided Venous Evaluation

Deep venous system evaluatiion shows patent veins with no obstruction or

significant reflux identified.



Sapheno Femoral junction: no reflux.

Femoral vein reflux: no reflux.

Greater Saphenous vein, Proximal thigh: reflux: no reflux.

Greater Saphenous vein, Distal thigh: reflux: no reflux.

Greater Saphenous vein, Proximal below knee: reflux: no reflux.

No significant Perforators identified.



Left Sided Venous Evaluation

Deep venous system evaluatiion shows patent veins with no obstruction or

significant reflux identified.



Sapheno Femoral junction: no reflux.

Femoral vein reflux: no reflux.

Greater Saphenous vein, Proximal thigh: reflux: no reflux.

Greater Saphenous vein, Distal thigh: reflux: no reflux.

Greater Saphenous vein, Proximal below knee: reflux: no reflux.

No significant Perforators identified.

____________________________________________________________________________





Interpretation Summary

No duplex evidence of DVT or obstruction in the bilateral lower

extremities. No significant deep or superficial reflux identified.

____________________________________________________________________________



____________________________________________________________________________



Electronically signed by:      Lennox Williams      on 04/10/2018 03:52 PM



CC: JESSI ROWE

>

Williams, Lennox

## 2018-04-10 NOTE — XCELERA REPORT
[address] [address]

                             Tel: [phone]

                             Fax: [phone]



                    Lower Extremity Arterial Evaluation

____________________________________________________________________________



Name: JOEL RUSSELL

MRN: [id number]                Age: 60 yrs

Gender: Male                   : 1958

Patient Status: Outpatient     Patient Location: 

Account #: [id number]

Study Date: 04/10/2018 09:04 AM

Accession #: [id number]

____________________________________________________________________________



Procedure: A color flow and duplex scan of the lower extremity arteries was

performed bilaterally with velocity and waveform anaylsis. Ankle brachial

indicies performed.

Reason For Study: ULCER





Ordering Physician: JESSI ROWE

Performed By: Elias Hester

____________________________________________________________________________



____________________________________________________________________________





Measurements and Calculations



                                   Right         Left

  CFA PSV                          125.7        126.4    cm/sec

  Prox PFA PSV                     -150.2       -62.9    cm/sec

  Prox SFA PSV                     130.4        105.5    cm/sec

  Mid SFA PSV                      -118.6       -110.3   cm/sec

  Dist SFA PSV                     -86.1        -86.6    cm/sec

  Prox Pop A PSV                    51.1         66.8    cm/sec

  Mid JUHI PSV                       91.8         92.6    cm/sec

  Dist JUHI PSV                      69.7                 cm/sec

  Dist PTA PSV                      92.1        116.3    cm/sec

  Arnaldo Pedis PSV                    -135.1       -119.4   cm/sec



____________________________________________________________________________



Right Side Arterial Evaluation

Normal velocity and triphasic waveforms noted from the Common Femoral

artery to the infregeniculate vessels.



0 % stenosis .



Ankle Brachial index is 1.22.



Left Side Arterial Evaluation

Normal velocity and triphasic waveforms noted from the Common Femoral

artery to the infrageniculate vessels.



0 % stenosis .



Ankle Brachial index not obtained, due to patient discomfort.

____________________________________________________________________________



Interpretation Summary

No hemodynamically significant lesions in the bilateral lower extremities,

on duplex imaging, at rest.

____________________________________________________________________________



Electronically signed by:      Lennox Williams      on 04/10/2018 03:45 PM



CC: JESSI ORWE

>

Williams, Lennox

## 2022-05-10 NOTE — EKG REPORT
SEVERITY:- BORDERLINE ECG -

SINUS RHYTHM

PROBABLE LEFT ATRIAL ABNORMALITY

BORDERLINE T WAVE ABNORMALITIES

:

Confirmed by: Viola Euceda 02-Jan-2018 09:39:31 Home